# Patient Record
Sex: FEMALE | Race: WHITE | NOT HISPANIC OR LATINO | URBAN - METROPOLITAN AREA
[De-identification: names, ages, dates, MRNs, and addresses within clinical notes are randomized per-mention and may not be internally consistent; named-entity substitution may affect disease eponyms.]

---
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
## 2022-01-05 ENCOUNTER — MYC MEDICAL ADVICE (OUTPATIENT)
Dept: FAMILY MEDICINE | Facility: CLINIC | Age: 22
End: 2022-01-05
Payer: COMMERCIAL

## 2022-01-05 DIAGNOSIS — R51.9 CHRONIC DAILY HEADACHE: Primary | ICD-10-CM

## 2022-01-13 ENCOUNTER — TRANSFERRED RECORDS (OUTPATIENT)
Dept: HEALTH INFORMATION MANAGEMENT | Facility: CLINIC | Age: 22
End: 2022-01-13
Payer: COMMERCIAL

## 2022-01-16 ENCOUNTER — HEALTH MAINTENANCE LETTER (OUTPATIENT)
Age: 22
End: 2022-01-16

## 2022-02-18 ENCOUNTER — OFFICE VISIT (OUTPATIENT)
Dept: URGENT CARE | Facility: URGENT CARE | Age: 22
End: 2022-02-18
Payer: COMMERCIAL

## 2022-02-18 VITALS
TEMPERATURE: 97.9 F | OXYGEN SATURATION: 99 % | SYSTOLIC BLOOD PRESSURE: 134 MMHG | HEART RATE: 83 BPM | WEIGHT: 141.8 LBS | BODY MASS INDEX: 20.35 KG/M2 | DIASTOLIC BLOOD PRESSURE: 81 MMHG

## 2022-02-18 DIAGNOSIS — R19.09 GROIN LUMP: ICD-10-CM

## 2022-02-18 DIAGNOSIS — R10.31 RLQ ABDOMINAL PAIN: Primary | ICD-10-CM

## 2022-02-18 DIAGNOSIS — R20.2 RIGHT LEG PARESTHESIAS: ICD-10-CM

## 2022-02-18 PROCEDURE — 99214 OFFICE O/P EST MOD 30 MIN: CPT | Performed by: PHYSICIAN ASSISTANT

## 2022-02-18 NOTE — PROGRESS NOTES
Chief Complaint   Patient presents with     Derm Problem     Bump on right groin area, right on the pelvic bone and it's making right leg numb. Onset- Three days                   ASSESSMENT:     ICD-10-CM    1. RLQ abdominal pain  R10.31    2. Groin lump  R19.09    3. Right leg paresthesias  R20.2            PLAN:RLQ stabbing pain 3 days ago plus r leg diffuse numbness which can be reproduced by pushing on a 1 mm groin lump and causes her toes to tingle. Limited on labs and imaging. ? Vascular, nerve, reproductive etiology    I have discussed clinical findings with patient.    All questions are answered, patient indicates understanding of these issues and is in agreement with plan.   Patient care instructions are discussed/given at the end of visit.     Amanda Castellanos PA-C      SUBJECTIVE:  21-year-old female presents for right lower quadrant pain that she described initially as a cramping sensation but now is a stabbing sensation.  Now today with a tender bump in the groin that causes tingling into her toes when she palpates it.  No constipation or diarrhea.  No nausea or vomiting.  No fever.  Appetite has been normal.  History of some low back pain but not worse with this.  No radicular pain from the back.  No bowel or bladder trouble.  No dysuria, frequency, urgency.      Allergies   Allergen Reactions     Egg [Chicken-Derived Products (Egg)]      Yolk cause stomach ache, diarrhea, nausea.  Egg whites okay to eat       Past Medical History:   Diagnosis Date     Dyslexia 6/12/2014       norethin-eth estradiol-fe (GILDESS 24 FE) 1-20 MG-MCG(24) tablet, Take 1 tablet by mouth daily    No current facility-administered medications on file prior to visit.      Social History     Tobacco Use     Smoking status: Never Smoker     Smokeless tobacco: Never Used   Substance Use Topics     Alcohol use: No       ROS:  CONSTITUTIONAL: Negative for fatigue or fever.  EYES: Negative for eye problems.  ENT: Negative for  sore throat .  RESP: Negative for shortness of breath   CV: Negative for chest pains.  GI: Negative for vomiting.  MUSCULOSKELETAL:  Negative for significant muscle or joint pains.  NEUROLOGIC: Negative for headaches.  SKIN: Negative for rash.    PSYCH: Normal mentation for age.    OBJECTIVE:  /81 (BP Location: Left arm, Patient Position: Sitting, Cuff Size: Adult Regular)   Pulse 83   Temp 97.9  F (36.6  C) (Tympanic)   Wt 64.3 kg (141 lb 12.8 oz)   SpO2 99%   BMI 20.35 kg/m      GENERAL APPEARANCE: Healthy, alert and no distress.  EYES:Conjunctiva/sclera clear.  NECK: Supple, nontender, no lymphadenopathy.  RESP: Lungs clear to auscultation - no rales, rhonchi or wheezes  CV: Regular rate and rhythm, normal S1 S2, no murmur noted.  NEURO: Awake, alert    SKIN: No rashes  Back-no CVA tenderness.  Lumbar spine is nontender to palpation.  Trochanteric area is nontender with full range of motion of the hips.  Right lower quadrant tenderness to palpation.  No rigidity, guarding or rebound.  Right groin area is with a 1 cm firm lump.  When I palpate on it it creates tingling into the toes.  Full range of motion of the right leg at the hip, knee, ankle.  Sensation to soft touch intact lower extremity.      Amanda Castellanos PA-C

## 2022-02-18 NOTE — PATIENT INSTRUCTIONS
RLQ stabbing pain 3 days ago plus r leg diffuse numbness which can be reproduced by pushing on a 1 mm groin lump and causes her toes to tingle. Limited on labs and imaging. ? Vascular, nerve, reproductive etiology

## 2022-02-23 ENCOUNTER — DOCUMENTATION ONLY (OUTPATIENT)
Dept: LAB | Facility: CLINIC | Age: 22
End: 2022-02-23
Payer: COMMERCIAL

## 2022-02-23 DIAGNOSIS — M32.0 DRUG-INDUCED SYSTEMIC LUPUS ERYTHEMATOSUS, UNSPECIFIED ORGAN INVOLVEMENT STATUS (H): Primary | ICD-10-CM

## 2022-02-23 DIAGNOSIS — M32.19 OTHER SYSTEMIC LUPUS ERYTHEMATOSUS WITH OTHER ORGAN INVOLVEMENT (H): ICD-10-CM

## 2022-02-23 NOTE — PROGRESS NOTES
Hello-   Patient is asking for a standing lab order for Lupus panel. See lab appointment notes for reference.     Please order future labs for 2-23 appointment. Thanks, Susie COXT

## 2022-02-24 ENCOUNTER — LAB (OUTPATIENT)
Dept: LAB | Facility: CLINIC | Age: 22
End: 2022-02-24
Payer: COMMERCIAL

## 2022-02-24 DIAGNOSIS — M32.19 OTHER SYSTEMIC LUPUS ERYTHEMATOSUS WITH OTHER ORGAN INVOLVEMENT (H): ICD-10-CM

## 2022-02-24 PROCEDURE — 86038 ANTINUCLEAR ANTIBODIES: CPT

## 2022-02-24 PROCEDURE — 36415 COLL VENOUS BLD VENIPUNCTURE: CPT

## 2022-02-24 PROCEDURE — 86235 NUCLEAR ANTIGEN ANTIBODY: CPT

## 2022-02-24 PROCEDURE — 86225 DNA ANTIBODY NATIVE: CPT

## 2022-02-25 LAB
ANA SER QL IF: NEGATIVE
DSDNA AB SER-ACNC: 1.2 IU/ML
ENA SM IGG SER IA-ACNC: <1.6 U/ML
ENA SM IGG SER IA-ACNC: NEGATIVE

## 2022-03-26 ASSESSMENT — PATIENT HEALTH QUESTIONNAIRE - PHQ9
SUM OF ALL RESPONSES TO PHQ QUESTIONS 1-9: 23
10. IF YOU CHECKED OFF ANY PROBLEMS, HOW DIFFICULT HAVE THESE PROBLEMS MADE IT FOR YOU TO DO YOUR WORK, TAKE CARE OF THINGS AT HOME, OR GET ALONG WITH OTHER PEOPLE: VERY DIFFICULT
SUM OF ALL RESPONSES TO PHQ QUESTIONS 1-9: 23

## 2022-03-27 ASSESSMENT — PATIENT HEALTH QUESTIONNAIRE - PHQ9: SUM OF ALL RESPONSES TO PHQ QUESTIONS 1-9: 23

## 2022-03-29 ENCOUNTER — OFFICE VISIT (OUTPATIENT)
Dept: OBGYN | Facility: CLINIC | Age: 22
End: 2022-03-29
Payer: COMMERCIAL

## 2022-03-29 VITALS
HEART RATE: 76 BPM | WEIGHT: 142.8 LBS | OXYGEN SATURATION: 97 % | BODY MASS INDEX: 20.44 KG/M2 | HEIGHT: 70 IN | SYSTOLIC BLOOD PRESSURE: 126 MMHG | DIASTOLIC BLOOD PRESSURE: 85 MMHG

## 2022-03-29 DIAGNOSIS — Z11.4 SCREENING FOR HIV (HUMAN IMMUNODEFICIENCY VIRUS): ICD-10-CM

## 2022-03-29 DIAGNOSIS — F32.0 CURRENT MILD EPISODE OF MAJOR DEPRESSIVE DISORDER WITHOUT PRIOR EPISODE (H): ICD-10-CM

## 2022-03-29 DIAGNOSIS — Z30.41 ENCOUNTER FOR SURVEILLANCE OF CONTRACEPTIVE PILLS: ICD-10-CM

## 2022-03-29 DIAGNOSIS — Z01.419 ENCOUNTER FOR GYNECOLOGICAL EXAMINATION WITHOUT ABNORMAL FINDING: Primary | ICD-10-CM

## 2022-03-29 DIAGNOSIS — Z11.3 SCREENING EXAMINATION FOR VENEREAL DISEASE: ICD-10-CM

## 2022-03-29 DIAGNOSIS — A60.00 GENITAL HERPES SIMPLEX, UNSPECIFIED SITE: ICD-10-CM

## 2022-03-29 DIAGNOSIS — Z11.59 NEED FOR HEPATITIS C SCREENING TEST: ICD-10-CM

## 2022-03-29 LAB — HCV AB SERPL QL IA: NONREACTIVE

## 2022-03-29 PROCEDURE — 87491 CHLMYD TRACH DNA AMP PROBE: CPT | Performed by: NURSE PRACTITIONER

## 2022-03-29 PROCEDURE — 86803 HEPATITIS C AB TEST: CPT | Performed by: NURSE PRACTITIONER

## 2022-03-29 PROCEDURE — 87591 N.GONORRHOEAE DNA AMP PROB: CPT | Performed by: NURSE PRACTITIONER

## 2022-03-29 PROCEDURE — 36415 COLL VENOUS BLD VENIPUNCTURE: CPT | Performed by: NURSE PRACTITIONER

## 2022-03-29 PROCEDURE — 87389 HIV-1 AG W/HIV-1&-2 AB AG IA: CPT | Performed by: NURSE PRACTITIONER

## 2022-03-29 PROCEDURE — 99385 PREV VISIT NEW AGE 18-39: CPT | Performed by: NURSE PRACTITIONER

## 2022-03-29 RX ORDER — VALACYCLOVIR HYDROCHLORIDE 500 MG/1
500 TABLET, FILM COATED ORAL 2 TIMES DAILY
Qty: 6 TABLET | Refills: 2 | Status: SHIPPED | OUTPATIENT
Start: 2022-03-29 | End: 2023-03-21

## 2022-03-29 ASSESSMENT — PAIN SCALES - GENERAL: PAINLEVEL: NO PAIN (0)

## 2022-03-29 NOTE — PROGRESS NOTES
SUBJECTIVE:   CC: Kallie Olsen is an 21 year old woman who presents for preventive health visit.     {Healthy Habits:     Getting at least 3 servings of Calcium per day:  Yes    Bi-annual eye exam:  Yes    Dental care twice a year:  Yes    Sleep apnea or symptoms of sleep apnea:  Daytime drowsiness    Diet:  Gluten-free/reduced and Other    Frequency of exercise:  4-5 days/week    Duration of exercise:  Greater than 60 minutes    Taking medications regularly:  Yes    Medication side effects:  None    PHQ-2 Total Score: 6    Additional concerns today:  Yes    History of genital HSV and recently used the last of her Valtrex, requests refills to have on hand.   History of major depression, has tried several different medications and did not do well on them. Not currently taking anything. PHQ-9 score is high today. Denies any plans to harm herself or others.       Today's PHQ-2 Score:   PHQ-2 ( 1999 Pf[employer]) 3/26/2022   Q1: Little interest or pleasure in doing things 3   Q2: Feeling down, depressed or hopeless 3   PHQ-2 Score 6   PHQ-2 Total Score (12-17 Years)- Positive if 3 or more points; Administer PHQ-A if positive -   Q1: Little interest or pleasure in doing things Nearly every day   Q2: Feeling down, depressed or hopeless Nearly every day   PHQ-2 Score 6       Abuse: Current or Past (Physical, Sexual or Emotional) - No  Do you feel safe in your environment? Yes        Social History     Tobacco Use     Smoking status: Never Smoker     Smokeless tobacco: Never Used   Substance Use Topics     Alcohol use: No         Alcohol Use 3/26/2022   Prescreen: >3 drinks/day or >7 drinks/week? Not Applicable   No flowsheet data found.    Reviewed orders with patient.  Reviewed health maintenance and updated orders accordingly - Yes  Patient Active Problem List   Diagnosis     Behavior problems     Dyslexia     Helicobacter pylori (H. pylori) infection     Current mild episode of major depressive disorder without prior  episode (H)     History of multiple concussions     Chronic daily headache     Positive TURNER (antinuclear antibody)     Past Surgical History:   Procedure Laterality Date     no surgical history         Social History     Tobacco Use     Smoking status: Never Smoker     Smokeless tobacco: Never Used   Substance Use Topics     Alcohol use: No     Family History   Problem Relation Age of Onset     Cancer - colorectal Maternal Grandmother      Cancer Maternal Grandmother      Diabetes Maternal Grandmother      Myocardial Infarction Maternal Grandfather      Alzheimer Disease Other         MGGF     Cancer Other         PGGF bladder     Breast Cancer Other         PGGM, great aunts paternal and maternal     Glaucoma No family hx of            Breast Cancer Screening:        History of abnormal Pap smear: NO - under age 21, PAP not appropriate for age     Reviewed and updated as needed this visit by clinical staff   Tobacco  Allergies  Meds   Med Hx  Surg Hx  Fam Hx  Soc Hx        Reviewed and updated as needed this visit by Provider                 Past Medical History:   Diagnosis Date     Dyslexia 6/12/2014      Past Surgical History:   Procedure Laterality Date     no surgical history         Review of Systems  CONSTITUTIONAL: NEGATIVE for fever, chills, change in weight  INTEGUMENTARU/SKIN: NEGATIVE for worrisome rashes, moles or lesions  EYES: NEGATIVE for vision changes or irritation  ENT: NEGATIVE for ear, mouth and throat problems  RESP: NEGATIVE for significant cough or SOB  BREAST: NEGATIVE for masses, tenderness or discharge  CV: NEGATIVE for chest pain, palpitations or peripheral edema  GI: NEGATIVE for nausea, abdominal pain, heartburn, or change in bowel habits  : NEGATIVE for unusual urinary or vaginal symptoms. Periods are regular.  MUSCULOSKELETAL: NEGATIVE for significant arthralgias or myalgia  NEURO: NEGATIVE for weakness, dizziness or paresthesias  PSYCHIATRIC: See PHQ-9     OBJECTIVE:   BP  "126/85 (BP Location: Right arm, Patient Position: Sitting, Cuff Size: Adult Regular)   Pulse 76   Ht 1.772 m (5' 9.75\")   Wt 64.8 kg (142 lb 12.8 oz)   LMP 03/08/2022 (Approximate)   SpO2 97%   BMI 20.64 kg/m    Physical Exam  GENERAL: healthy, alert and no distress  EYES: Eyes grossly normal to inspection, PERRL and conjunctivae and sclerae normal  HENT: ear canals and TM's normal  NECK: no adenopathy, no asymmetry, masses, or scars and thyroid normal to palpation  RESP: lungs clear to auscultation - no rales, rhonchi or wheezes  BREAST: normal without masses, tenderness or nipple discharge and no palpable axillary masses or adenopathy  CV: regular rate and rhythm, normal S1 S2, no S3 or S4, no murmur, click or rub, no peripheral edema and peripheral pulses strong  ABDOMEN: soft, nontender, no hepatosplenomegaly, no masses and bowel sounds normal   (female): normal female external genitalia, normal urethral meatus, vaginal mucosa pink, moist, well rugated, and normal cervix/adnexa/uterus without masses or discharge  MS: no gross musculoskeletal defects noted, no edema  SKIN: no suspicious lesions or rashes  NEURO: Normal strength and tone, mentation intact and speech normal  PSYCH: mentation appears normal, affect normal/bright    ASSESSMENT/PLAN:   (Z01.419) Encounter for gynecological examination without abnormal finding  (primary encounter diagnosis)  Comment: Patient was requesting pap smear and discussed that we begin screening at age 23.     (Z11.3) Screening examination for venereal disease  Plan: Chlamydia trachomatis PCR, Neisseria         gonorrhoeae PCR        (Z11.59) Need for hepatitis C screening test  Plan: Hepatitis C antibody         (Z11.4) Screening for HIV (human immunodeficiency virus)  Plan: HIV Antigen Antibody Combo          (F32.0) Current mild episode of major depressive disorder without prior episode (H)  Comment: We reviewed her PHQ-9 in detail. Patient denies any specific plans " "or intent to harm herself. Has tried medications in the past and did not do well. We discussed option of referral to a mental health provider and she declines. Discussed providing her with [facility] crisis information and she declines. States her parents provide support. Declines any other intervention at this time. To ER immediately with thoughts of harm to self or other and she verbalizes understanding. To return to clinic if she would like to discuss intervention options.    (A60.00) Genital herpes simplex, unspecified site  Plan: valACYclovir (VALTREX) 500 MG tablet          (Z30.41) Encounter for surveillance of contraceptive pills  Comment: Doing well on current oral contraceptive pill, will refill x 1 year  Plan: norethin-eth estradiol-fe (GILDESS 24 FE) 1-20         MG-MCG(24) tablet       COUNSELING:  Reviewed preventive health counseling, as reflected in patient instructions  Special attention given to:        Regular exercise       Healthy diet/nutrition       Contraception       Safe sex practices/STD prevention       Consider Hep C screening for all patients one time for ages 18-79 years       HIV screeninx in teen years, 1x in adult years, and at intervals if high risk    Estimated body mass index is 20.64 kg/m  as calculated from the following:    Height as of this encounter: 1.772 m (5' 9.75\").    Weight as of this encounter: 64.8 kg (142 lb 12.8 oz).        She reports that she has never smoked. She has never used smokeless tobacco.      Counseling Resources:  ATP IV Guidelines  Pooled Cohorts Equation Calculator  Breast Cancer Risk Calculator  BRCA-Related Cancer Risk Assessment: FHS-7 Tool  FRAX Risk Assessment  ICSI Preventive Guidelines  Dietary Guidelines for Americans,   USDA's MyPlate  ASA Prophylaxis  Lung CA Screening    KENDAL Medrano [facility]  Answers for HPI/ROS submitted by the patient on 3/26/2022  If you checked off any problems, how " difficult have these problems made it for you to do your work, take care of things at home, or get along with other people?: Very difficult  PHQ9 TOTAL SCORE: 23

## 2022-03-30 ENCOUNTER — MYC MEDICAL ADVICE (OUTPATIENT)
Dept: OBGYN | Facility: CLINIC | Age: 22
End: 2022-03-30
Payer: COMMERCIAL

## 2022-03-30 DIAGNOSIS — Z30.41 ENCOUNTER FOR SURVEILLANCE OF CONTRACEPTIVE PILLS: Primary | ICD-10-CM

## 2022-03-30 LAB
C TRACH DNA SPEC QL NAA+PROBE: NEGATIVE
HIV 1+2 AB+HIV1 P24 AG SERPL QL IA: NONREACTIVE
N GONORRHOEA DNA SPEC QL NAA+PROBE: NEGATIVE

## 2022-03-31 RX ORDER — NORETHINDRONE ACETATE AND ETHINYL ESTRADIOL 1.5-30(21)
1 KIT ORAL DAILY
Qty: 84 TABLET | Refills: 3 | Status: SHIPPED | OUTPATIENT
Start: 2022-03-31 | End: 2022-06-03 | Stop reason: ALTCHOICE

## 2022-03-31 NOTE — TELEPHONE ENCOUNTER
"Pt was seen on 3/29/22 with KENDAL Harris CNP, for a yearly physical.  Per OV plan:  \"Z30.41) Encounter for surveillance of contraceptive pills  Comment: Doing well on current oral contraceptive pill, will refill x 1 year  Plan: norethin-eth estradiol-fe (GILDESS 24 FE) 1-20         MG-MCG(24) tablet\"    Pt states that the pharmacist told her that she was prescribed a lower dose of BCP than what she was on.  She states when she was on the lower dose in the past she would \"spit\" (I think she meant spot) throughout the month.  Pt is asking for an rx of Junel Fe 1.5/30 be sent to pharmacy.    Routing to Minnie to approve a different BCP as appropriate.    Carmen Duran RN      "

## 2022-05-06 ASSESSMENT — PATIENT HEALTH QUESTIONNAIRE - PHQ9
10. IF YOU CHECKED OFF ANY PROBLEMS, HOW DIFFICULT HAVE THESE PROBLEMS MADE IT FOR YOU TO DO YOUR WORK, TAKE CARE OF THINGS AT HOME, OR GET ALONG WITH OTHER PEOPLE: SOMEWHAT DIFFICULT
SUM OF ALL RESPONSES TO PHQ QUESTIONS 1-9: 22
SUM OF ALL RESPONSES TO PHQ QUESTIONS 1-9: 22

## 2022-05-07 ASSESSMENT — PATIENT HEALTH QUESTIONNAIRE - PHQ9: SUM OF ALL RESPONSES TO PHQ QUESTIONS 1-9: 22

## 2022-05-09 ENCOUNTER — OFFICE VISIT (OUTPATIENT)
Dept: FAMILY MEDICINE | Facility: CLINIC | Age: 22
End: 2022-05-09
Payer: COMMERCIAL

## 2022-05-09 VITALS
SYSTOLIC BLOOD PRESSURE: 106 MMHG | OXYGEN SATURATION: 97 % | HEART RATE: 91 BPM | RESPIRATION RATE: 14 BRPM | TEMPERATURE: 98.5 F | BODY MASS INDEX: 20.04 KG/M2 | DIASTOLIC BLOOD PRESSURE: 72 MMHG | WEIGHT: 140 LBS | HEIGHT: 70 IN

## 2022-05-09 DIAGNOSIS — F33.2 SEVERE EPISODE OF RECURRENT MAJOR DEPRESSIVE DISORDER, WITHOUT PSYCHOTIC FEATURES (H): ICD-10-CM

## 2022-05-09 DIAGNOSIS — Z87.820 HISTORY OF MULTIPLE CONCUSSIONS: ICD-10-CM

## 2022-05-09 DIAGNOSIS — R41.844 IMPAIRED EXECUTIVE FUNCTIONING: ICD-10-CM

## 2022-05-09 DIAGNOSIS — R41.3 POOR SHORT-TERM MEMORY: Primary | ICD-10-CM

## 2022-05-09 PROBLEM — F33.9 EPISODE OF RECURRENT MAJOR DEPRESSIVE DISORDER (H): Status: ACTIVE | Noted: 2020-02-17

## 2022-05-09 PROCEDURE — 99213 OFFICE O/P EST LOW 20 MIN: CPT | Performed by: FAMILY MEDICINE

## 2022-05-09 RX ORDER — SERTRALINE HYDROCHLORIDE 25 MG/1
25 TABLET, FILM COATED ORAL DAILY
Qty: 30 TABLET | Refills: 2 | Status: SHIPPED | OUTPATIENT
Start: 2022-05-09 | End: 2022-07-22 | Stop reason: DRUGHIGH

## 2022-05-09 ASSESSMENT — PATIENT HEALTH QUESTIONNAIRE - PHQ9
SUM OF ALL RESPONSES TO PHQ QUESTIONS 1-9: 22
10. IF YOU CHECKED OFF ANY PROBLEMS, HOW DIFFICULT HAVE THESE PROBLEMS MADE IT FOR YOU TO DO YOUR WORK, TAKE CARE OF THINGS AT HOME, OR GET ALONG WITH OTHER PEOPLE: SOMEWHAT DIFFICULT

## 2022-05-09 ASSESSMENT — PAIN SCALES - GENERAL: PAINLEVEL: MODERATE PAIN (4)

## 2022-05-09 NOTE — PATIENT INSTRUCTIONS
At [facility], we strive to deliver an exceptional experience to you, every time we see you. If you receive a survey, please complete it as we do value your feedback.  If you have MyChart, you can expect to receive results automatically within 24 hours of their completion.  Your provider will send a note interpreting your results as well.   If you do not have MyChart, you should receive your results in about a week by mail.    Your care team:                            Family Medicine Internal Medicine   MD Sam Almanzar MD Shantel Branch-Fleming, MD Srinivasa Vaka, MD Katya Belousova, KENDAL Christian CNP, MD ([facility]   Hermes De La Rosa, MD Fabiana George MD Amelia Massimini APRN CNP Kim Thein, APRN CNP Bethany Templen, MD             Clinic hours: Monday - Thursday 7 am-6 pm; Fridays 7 am-5 pm.   Urgent care: Monday - Friday 10 am- 8 pm; Saturday and Sunday 9 am-5 pm.    Clinic: [phone]       [facility]: Monday - Thursday 8 am - 7 pm; Friday 8 am - 6 pm  [facility]: [phone]

## 2022-05-09 NOTE — PROGRESS NOTES
Assessment & Plan     (R41.3) Poor short-term memory  (primary encounter diagnosis)  (R41.844) Impaired executive functioning  (Z87.820) History of multiple concussions  Comment: It seems the patient and her mother are seeking neuropsychometric testing  Plan: Adult Neuropsychology Referral, Adult Mental         [facility]  Referral          (F33.2) Severe episode of recurrent major depressive disorder, without psychotic features (H)  Comment: She failed bupropion, and she experienced side effects with the venlafaxine.  I advised the patient and her mother that we cannot completely discount severe depression as the cause of this.  Issue with executive functioning (i.e. anhedonia and poor concentration, symptoms of depression, coincidentally occurring around the time of her most recent head injury).  The converse could also be true.  Regardless, it seems important that we find a medication to treat your depression that does not cause side effects  Plan: sertraline (ZOLOFT) 25 MG tablet, Adult Mental         Health  Referral, [facility]          Referral         Return in about 5 weeks (around 6/13/2022) for recheck depression, or sooner if symptoms worsen.          27 minutes spent on the date of the encounter doing chart review, patient visit and documentation       Earl Solo MD  [facility] LAURA Arreguin is a 21 year old who presents for the following health issues  accompanied by her mother.    History of Present Illness       Mental Health Follow-up:                    Today's PHQ-9         PHQ-9 Total Score: 22  PHQ-9 Q9 Thoughts of better off dead/self-harm past 2 weeks :   (P) More than half the days  Thoughts of suicide or self harm: (P) Yes  Self-harm Plan:   (P) No  Self-harm Action:     (P) No  Safety concerns for self or others: (P) No    How difficult have these problems made it for you to do your work, take care of things at  "home, or get along with other people: Somewhat difficult        Reason for visit:  Discuss memory and executive function skills  Symptom onset:  More than a month  Symptom intensity:  Moderate  Symptom progression:  Worsening  Had these symptoms before:  Yes  Has tried/received treatment for these symptoms:  No  What makes it worse:  N/a  What makes it better:  N/a    She eats 2-3 servings of fruits and vegetables daily.She consumes 0 sweetened beverage(s) daily.She exercises with enough effort to increase her heart rate 60 or more minutes per day.  She exercises with enough effort to increase her heart rate 6 days per week.   She is taking medications regularly.         Review of Systems         Objective    /72 (BP Location: Right arm, Patient Position: Sitting, Cuff Size: Adult Regular)   Pulse 91   Temp 98.5  F (36.9  C) (Tympanic)   Resp 14   Ht 1.773 m (5' 9.8\")   Wt 63.5 kg (140 lb)   SpO2 97%   BMI 20.20 kg/m    Body mass index is 20.2 kg/m .  Physical Exam   GENERAL: healthy, alert and no distress  EYES: Eyes grossly normal to inspection, PERRL, EOMI, sclerae white and conjunctivae normal  MS: no gross musculoskeletal defects noted, no edema  SKIN: no suspicious lesions or rashes to visible skin  NEURO: Normal strength and tone, sensory exam grossly normal, mentation intact, oriented times 3 and cranial nerves 2-12 intact  PSYCH: mentation appears slightly slowed but otherwise normal normal, affect normal, nonsuicidal                "

## 2022-06-01 NOTE — PROGRESS NOTES
Kallie is a 21 year old who is being evaluated via a billable telephone visit.      What phone number would you like to be contacted at? [phone]  How would you like to obtain your AVS? Ortega    Assessment & Plan     Breakthrough bleeding on birth control pills  We discussed her breakthrough bleeding and management options. Would like to try alternate pill. Discussed rationale for pill below, when to change. May have some breakthrough bleeding the first few packs as she adjusts to new pills. If it persists beyond 3 packs, would recommend follow up appointment and consider ultrasound and labs.  Patient is given an opportunity to ask questions and have them answered.  - desogestrel-ethinyl estradiol (APRI) 0.15-30 MG-MCG tablet; Take 1 tablet by mouth daily    KENDAL Medrano [facility]   Kallie is a 21 year old who requested a telephone visit for the following health issues    HPI     Breakthrough bleeding with OCP    Patient on combined oral contraceptive pill and taking it regularly. For the last several packs, has been having breakthrough bleeding. Can occur at varied times during the pack, flow varies from spotting to light. Lasts a few days, can stop and restart. During placebo week, cycle starting towards end of the week now. Occasional cramping, no clots. No heavy flow. Denies missed or late pills, no major lifestyle changes. Denies abnormal vaginal or urinary symptoms, pelvic pain, no STI concerns.    Review of Systems   Constitutional, HEENT, cardiovascular, pulmonary, gi and gu systems are negative, except as otherwise noted.      Objective         Vitals:  No vitals were obtained today due to virtual visit.    Physical Exam   healthy and alert  PSYCH: Alert and oriented times 3; coherent speech, normal   rate and volume, able to articulate logical thoughts, able   to abstract reason, no tangential thoughts, no hallucinations   or delusions  Her  affect is normal  RESP: No cough, no audible wheezing, able to talk in full sentences  Remainder of exam unable to be completed due to telephone visits      Phone call duration: 7 minutes

## 2022-06-03 ENCOUNTER — VIRTUAL VISIT (OUTPATIENT)
Dept: OBGYN | Facility: CLINIC | Age: 22
End: 2022-06-03
Payer: COMMERCIAL

## 2022-06-03 DIAGNOSIS — F33.2 SEVERE EPISODE OF RECURRENT MAJOR DEPRESSIVE DISORDER, WITHOUT PSYCHOTIC FEATURES (H): ICD-10-CM

## 2022-06-03 DIAGNOSIS — N92.1 BREAKTHROUGH BLEEDING ON BIRTH CONTROL PILLS: Primary | ICD-10-CM

## 2022-06-03 PROCEDURE — 99213 OFFICE O/P EST LOW 20 MIN: CPT | Mod: 95 | Performed by: NURSE PRACTITIONER

## 2022-06-03 RX ORDER — DESOGESTREL AND ETHINYL ESTRADIOL 0.15-0.03
1 KIT ORAL DAILY
Qty: 84 TABLET | Refills: 3 | Status: SHIPPED | OUTPATIENT
Start: 2022-06-03 | End: 2023-03-21

## 2022-06-12 NOTE — TELEPHONE ENCOUNTER
FUTURE VISIT INFORMATION      FUTURE VISIT INFORMATION:    Date: 6/27/2022    Time: 11am    Location: [facility]  REFERRAL INFORMATION:    Referring provider:  Self     Referring providers clinic:      Reason for visit/diagnosis  Concussion     RECORDS REQUESTED FROM:       Clinic name Comments Records Status Imaging Status   Internal Dr. Solo-5/9/2022 Epic No Images          [facility]  MR Brain-3/3/2020  MR cervical spine - 01/29/2020   Scanned to Chart Requested to PACS                       Images in PACS  Riya Rios on 6/21/2022 at 12:56 PM    6/13/2022-[facility] 748am

## 2022-06-27 ENCOUNTER — VIRTUAL VISIT (OUTPATIENT)
Dept: NEUROLOGY | Facility: CLINIC | Age: 22
End: 2022-06-27
Payer: COMMERCIAL

## 2022-06-27 ENCOUNTER — PRE VISIT (OUTPATIENT)
Dept: NEUROLOGY | Facility: CLINIC | Age: 22
End: 2022-06-27
Payer: COMMERCIAL

## 2022-06-27 DIAGNOSIS — F41.9 ANXIETY: ICD-10-CM

## 2022-06-27 DIAGNOSIS — Z87.820 HISTORY OF MULTIPLE CONCUSSIONS: ICD-10-CM

## 2022-06-27 DIAGNOSIS — R51.9 CHRONIC DAILY HEADACHE: Primary | ICD-10-CM

## 2022-06-27 DIAGNOSIS — R48.0 DYSLEXIA: ICD-10-CM

## 2022-06-27 DIAGNOSIS — R41.3 SHORT-TERM MEMORY LOSS: ICD-10-CM

## 2022-06-27 PROCEDURE — 99205 OFFICE O/P NEW HI 60 MIN: CPT | Mod: 95 | Performed by: STUDENT IN AN ORGANIZED HEALTH CARE EDUCATION/TRAINING PROGRAM

## 2022-06-27 RX ORDER — RIZATRIPTAN BENZOATE 10 MG/1
10 TABLET ORAL
Qty: 9 TABLET | Refills: 4 | Status: SHIPPED | OUTPATIENT
Start: 2022-06-27 | End: 2023-09-28

## 2022-06-27 RX ORDER — GABAPENTIN 300 MG/1
300 CAPSULE ORAL 3 TIMES DAILY
Qty: 90 CAPSULE | Refills: 5 | Status: SHIPPED | OUTPATIENT
Start: 2022-06-27 | End: 2022-10-03

## 2022-06-27 NOTE — PATIENT INSTRUCTIONS
Magnesium oxide 400 mg Riboflavin (vitamin b2) 400 mg daily  Start gabapentin 300 mg nightly. After about one week, take 300mg twice daily. After another week, take 300mg three times daily for a total of 900mg per day.--start this in a few weeks time to let zoloft kick in  A common side effect of gabapentin is fatigue, which is why we advise patients to start taking the medication at nighttime. The side effect of fatigue should resolve with time.  Other possible side effects include swelling, and less likely diarrhea.  It is commonly a well tolerated medicine.      Increase zoloft to 50 mg daily, F/U with your PCP in this regard too.     Other ideas discussed:  Ocular PT  Psychology/a therapist.

## 2022-06-27 NOTE — PROGRESS NOTES
[facility]/[place]  Section of General Neurology  New Patient  Virtual Visit      Kallie Olsen MRN# [id number]   Age: 22 year old YOB: 2000     Requesting physician: No ref. provider found  Earl Solo     Reason for Consultation: Headaches, poor short term memory.         History of Presenting Symptoms:   Kallie Olsen is a 22 year old female who presents today for evaluation of poor short term memory, executive dysfunction  She has a history of headaches, depression, multiple concussions.  She has been put on zoloft for depression didn't tolerate or derive benefit from venlafaxine/buproprion previously, of note.  She has a neuropsychology referral in place in this regard scheduled for October.    Concussion hx:   Last concussion spring 2019--history books fell off a shelf at hit her head.  3 in total  None with LOC     Headache--Since last concussion, but had before then as well to a lesser extent.    Stabbing pains, start at temple, go all the way, occasional phono/photophobia,  Is always nauseous.  Does get visual aura at times.    If she gets a really bad headache she is curled up in a ball, can't move.  Has had to go to ER and migraine cocktail has helped.    Has had issues with coordinating eye movements --visual processing doesn't get to her brain.     She is at a 4 most days re pain levels  She potentially has tried imitrex and it didn't  Mood --Remains not the best.  Zoloft hasn't helped but she is tolerating it OK.  Had trouble connecting with Dr. Solo this morning.    Sleep -- Is fine.  Can stay asleep.  Is tired all day however.  Is getting 7-8 hours a night  Is taking classes right now--at [employer], working on exercise science degree.    She notes a poor attention span overall.  She falls asleep in class.  She does have dyslexia also.  Has previously worked with a  in this regard too.   Gene testing medicines she would tolerate poorly:  Remeron,  luvox, cymbalta, per mom    Memory: Has issues with short term memory she attributes to concussive history.  Though she had to work extra hard with her dyslexia too.      They live in [place]      Past Medical History:     Patient Active Problem List   Diagnosis     Behavior problems     Dyslexia     Helicobacter pylori (H. pylori) infection     Episode of recurrent major depressive disorder (H)     History of multiple concussions     Chronic daily headache     Positive TURNER (antinuclear antibody)     Past Medical History:   Diagnosis Date     Current mild episode of major depressive disorder without prior episode (H) 2/17/2020     Dyslexia 6/12/2014        Past Surgical History:     Past Surgical History:   Procedure Laterality Date     no surgical history          Social History:     Social History     Tobacco Use     Smoking status: Never Smoker     Smokeless tobacco: Never Used   Substance Use Topics     Alcohol use: No     Drug use: No        Family History:     Family History   Problem Relation Age of Onset     Cancer - colorectal Maternal Grandmother      Cancer Maternal Grandmother      Diabetes Maternal Grandmother      Myocardial Infarction Maternal Grandfather      Alzheimer Disease Other         MGGF     Cancer Other         PGGF bladder     Breast Cancer Other         PGGM, great aunts paternal and maternal     Glaucoma No family hx of         Medications:     Current Outpatient Medications   Medication Sig     desogestrel-ethinyl estradiol (APRI) 0.15-30 MG-MCG tablet Take 1 tablet by mouth daily     sertraline (ZOLOFT) 25 MG tablet Take 1 tablet (25 mg) by mouth daily     valACYclovir (VALTREX) 500 MG tablet Take 1 tablet (500 mg) by mouth 2 times daily for 3 days     No current facility-administered medications for this visit.        Allergies:     Allergies   Allergen Reactions     Egg [Chicken-Derived Products (Egg)]      Yolk cause stomach ache, diarrhea, nausea.  Egg whites okay to eat         Review of Systems:   As noted above     Physical Exam:   General: Seated comfortably in no acute distress.  HEENT: Neck with normal range of motion as can be assessed.   Skin: No rashes  Neurologic:     Mental Status: Fully alert, attentive and oriented. Speech clear and fluent.  Can do #s forwards and backwards. Serial 7s: Got 93, then struggled, Repetition intact.  Abstraction intact.  5/5 delayed recall though took some time.       Cranial Nerves: EOMI with normal smooth pursuit--looking up cause some diplopia, this is a chronic problem. Facial movements symmetric. Hearing not formally tested but intact to conversation.  No dysarthria.     Motor: No tremors or other abnormal movements observed.      Coordination: Finger-nose-finger without dysmetria.             Data: Pertinent prior to visit   Imaging:      I personally reviewed the above imaging and agree with the findings in the report--normal MRI brain.               Assessment and Plan:   Assessment:  Kallie Olsen is a pleasant 22 year old female who presents in consultation for short term memory loss, post concussive daily chronic headache, anxiety/depression.   We discussed that I think her memory issues are multi factorial. She has overcome a lot re her dyslexia but this could have predisposed her to a more challenging post concussive recovery given that she has had 3 to report.  I do think mood is a large factor.  I focus on improving mood, sleep if applicable and headaches in post concussive patients because I find these to be the most actionable elements besides targeted therapy.  She is tolerating low dose zoloft via Dr. Solo well, are interested in trying higher doses until they can see him again.    Her headache is mixed with some tension, some migrainous components, will try medications in this regard as well as below.   MRI brain reviewed--quite normal.    Overall our goals are: Better headache control, improved mood and see if this can  improve memory complaints.       Plan:  ---Magnesium oxide 400 mg Riboflavin (vitamin b2) 400 mg daily for headache prevetion  --Start gabapentin 300 mg nightly. After about one week, take 300mg twice daily. After another week, take 300mg three times daily for a total of 900mg per day. She was instructed to start this in a few weeks time to let higher dose of zoloft take effect first/be more scientific in this regard. Side effects discussed  --Increase zoloft to 50 mg daily, she will follow up with Dr. Solo in this regard soon as well.    --As she feels she may have failed imitrex previously will trial maxalt 10 mg PRN for bad headache days, side effects also discussed in this regard  Other ideas discussed:  --Ocular PT for continued visual issues post concussively  --Psychology/a therapist.  Therapy + an selective serotonin reuptake inhibitor is the best approach in my opinion.  She will think about this  All questions answered they will follow up with me after previously scheduled neuropsychological testing in October but will reach out with questions in the mean time.                 Neto Balderrama MD   of Neurology   [facility]      The total time of this encounter today amounted to 42 minutes of time on video visit and 62 minutes in total. This time included time spent with the patient, prep work, ordering tests, and performing post visit documentation.

## 2022-06-27 NOTE — PROGRESS NOTES
Kallie is a 22 year old who is being evaluated via a billable video visit.      How would you like to obtain your AVS? Mail a copy  If the video visit is dropped, the invitation should be resent by:verito  Will anyone else be joining your video visit? No

## 2022-06-27 NOTE — LETTER
6/27/2022         RE: Kallie Olsen  [address]  [place] [address]        Dear Colleague,    Thank you for referring your patient, Kallie Olsen, to the [facility] [place]. Please see a copy of my visit note below.    [facility]/[place]  Section of General Neurology  New Patient  Virtual Visit      Kallie Olsen MRN# [id number]   Age: 22 year old YOB: 2000     Requesting physician: No ref. provider found  Earl Solo     Reason for Consultation: Headaches, poor short term memory.         History of Presenting Symptoms:   Kallie Olsen is a 22 year old female who presents today for evaluation of poor short term memory, executive dysfunction  She has a history of headaches, depression, multiple concussions.  She has been put on zoloft for depression didn't tolerate or derive benefit from venlafaxine/buproprion previously, of note.  She has a neuropsychology referral in place in this regard scheduled for October.    Concussion hx:   Last concussion spring 2019--history books fell off a shelf at hit her head.  3 in total  None with LOC     Headache--Since last concussion, but had before then as well to a lesser extent.    Stabbing pains, start at temple, go all the way, occasional phono/photophobia,  Is always nauseous.  Does get visual aura at times.    If she gets a really bad headache she is curled up in a ball, can't move.  Has had to go to ER and migraine cocktail has helped.    Has had issues with coordinating eye movements --visual processing doesn't get to her brain.     She is at a 4 most days re pain levels  She potentially has tried imitrex and it didn't  Mood --Remains not the best.  Zoloft hasn't helped but she is tolerating it OK.  Had trouble connecting with Dr. Solo this morning.    Sleep -- Is fine.  Can stay asleep.  Is tired all day however.  Is getting 7-8 hours a night  Is taking classes right now--at  [employer], working on exercise science degree.    She notes a poor attention span overall.  She falls asleep in class.  She does have dyslexia also.  Has previously worked with a  in this regard too.   Gene testing medicines she would tolerate poorly:  Remeron, luvox, cymbalta, per mom    Memory: Has issues with short term memory she attributes to concussive history.  Though she had to work extra hard with her dyslexia too.      They live in [place]      Past Medical History:     Patient Active Problem List   Diagnosis     Behavior problems     Dyslexia     Helicobacter pylori (H. pylori) infection     Episode of recurrent major depressive disorder (H)     History of multiple concussions     Chronic daily headache     Positive TURNER (antinuclear antibody)     Past Medical History:   Diagnosis Date     Current mild episode of major depressive disorder without prior episode (H) 2/17/2020     Dyslexia 6/12/2014        Past Surgical History:     Past Surgical History:   Procedure Laterality Date     no surgical history          Social History:     Social History     Tobacco Use     Smoking status: Never Smoker     Smokeless tobacco: Never Used   Substance Use Topics     Alcohol use: No     Drug use: No        Family History:     Family History   Problem Relation Age of Onset     Cancer - colorectal Maternal Grandmother      Cancer Maternal Grandmother      Diabetes Maternal Grandmother      Myocardial Infarction Maternal Grandfather      Alzheimer Disease Other         MGGF     Cancer Other         PGGF bladder     Breast Cancer Other         PGGM, great aunts paternal and maternal     Glaucoma No family hx of         Medications:     Current Outpatient Medications   Medication Sig     desogestrel-ethinyl estradiol (APRI) 0.15-30 MG-MCG tablet Take 1 tablet by mouth daily     sertraline (ZOLOFT) 25 MG tablet Take 1 tablet (25 mg) by mouth daily     valACYclovir (VALTREX) 500 MG tablet Take 1 tablet (500  mg) by mouth 2 times daily for 3 days     No current facility-administered medications for this visit.        Allergies:     Allergies   Allergen Reactions     Egg [Chicken-Derived Products (Egg)]      Yolk cause stomach ache, diarrhea, nausea.  Egg whites okay to eat        Review of Systems:   As noted above     Physical Exam:   General: Seated comfortably in no acute distress.  HEENT: Neck with normal range of motion as can be assessed.   Skin: No rashes  Neurologic:     Mental Status: Fully alert, attentive and oriented. Speech clear and fluent.  Can do #s forwards and backwards. Serial 7s: Got 93, then struggled, Repetition intact.  Abstraction intact.  5/5 delayed recall though took some time.       Cranial Nerves: EOMI with normal smooth pursuit--looking up cause some diplopia, this is a chronic problem. Facial movements symmetric. Hearing not formally tested but intact to conversation.  No dysarthria.     Motor: No tremors or other abnormal movements observed.      Coordination: Finger-nose-finger without dysmetria.             Data: Pertinent prior to visit   Imaging:      I personally reviewed the above imaging and agree with the findings in the report--normal MRI brain.               Assessment and Plan:   Assessment:  Kallie Olsen is a pleasant 22 year old female who presents in consultation for short term memory loss, post concussive daily chronic headache, anxiety/depression.   We discussed that I think her memory issues are multi factorial. She has overcome a lot re her dyslexia but this could have predisposed her to a more challenging post concussive recovery given that she has had 3 to report.  I do think mood is a large factor.  I focus on improving mood, sleep if applicable and headaches in post concussive patients because I find these to be the most actionable elements besides targeted therapy.  She is tolerating low dose zoloft via Dr. oSlo well, are interested in trying higher doses until  they can see him again.    Her headache is mixed with some tension, some migrainous components, will try medications in this regard as well as below.   MRI brain reviewed--quite normal.    Overall our goals are: Better headache control, improved mood and see if this can improve memory complaints.       Plan:  ---Magnesium oxide 400 mg Riboflavin (vitamin b2) 400 mg daily for headache prevetion  --Start gabapentin 300 mg nightly. After about one week, take 300mg twice daily. After another week, take 300mg three times daily for a total of 900mg per day. She was instructed to start this in a few weeks time to let higher dose of zoloft take effect first/be more scientific in this regard. Side effects discussed  --Increase zoloft to 50 mg daily, she will follow up with Dr. Solo in this regard soon as well.    --As she feels she may have failed imitrex previously will trial maxalt 10 mg PRN for bad headache days, side effects also discussed in this regard  Other ideas discussed:  --Ocular PT for continued visual issues post concussively  --Psychology/a therapist.  Therapy + an selective serotonin reuptake inhibitor is the best approach in my opinion.  She will think about this  All questions answered they will follow up with me after previously scheduled neuropsychological testing in October but will reach out with questions in the mean time.                 Neto Balderrama MD   of Neurology   [facility]      The total time of this encounter today amounted to 42 minutes of time on video visit and 62 minutes in total. This time included time spent with the patient, prep work, ordering tests, and performing post visit documentation.      Kallie is a 22 year old who is being evaluated via a billable video visit.      How would you like to obtain your AVS? Mail a copy  If the video visit is dropped, the invitation should be resent by:mychart  Will anyone else be joining your video  visit? No                Again, thank you for allowing me to participate in the care of your patient.        Sincerely,        Demarcus Balderrama MD

## 2022-06-28 RX ORDER — SERTRALINE HYDROCHLORIDE 25 MG/1
TABLET, FILM COATED ORAL
Qty: 90 TABLET | Refills: 1 | OUTPATIENT
Start: 2022-06-28

## 2022-07-15 ENCOUNTER — TELEPHONE (OUTPATIENT)
Dept: BEHAVIORAL HEALTH | Facility: CLINIC | Age: 22
End: 2022-07-15

## 2022-07-15 NOTE — TELEPHONE ENCOUNTER
[facility] PHQ-9 Follow-up  Behavioral Health Clinician Triage Service    [facility] PHQ-[facility] Responses:  [facility] Follow-up to PHQ 5/6/2022 6/24/2022 7/9/2022   PHQ-9 9. Suicide Ideation past 2 weeks More than half the days Nearly every day Nearly every day   Thoughts of suicide or self harm in past 2 weeks Yes Yes Yes   Thoughts of suicide or self harm in past 2 weeks Yes Yes Yes   PHQ-9 Self harm plan? No No No   PHQ-9 Self harm action? No No No   PHQ-9 Safety concerns? No No No   PHQ-9 Self harm plan? No No No   PHQ-9 Self harm action? No No No   PHQ-9 Safety concerns? No No No        1st Outreach Date: July 15, 2022 Time: 9:20a  Outcome: Left a message for patient to call [facility].  If patient doesn't return the call the [facility] Pool will make one more phone attempt within 24 hours.    [facility]  2nd Outreach Date: July 15, 2022 Time: 3:20p  Outcome: Completed phone conversation / triage service.  See assessment and disposition below.  Yudelka Wong [facility], [facility] my name is Yudelka.  I m calling from [facility] to follow-up on a questionnaire you completed on [employer].      If it s ok I d like review a few of your symptoms/responses and a talk briefly  about how you have been doing to see if I might be able to provide some support and guidance.       Address/Location of patient: home  Have any supportive people near them? yes, parents      * [facility] spoke with pt this afternoon via phone.  Pt shares that she has had SI off and on for a long time but denies current plan or intent.  She shares that she did not attend her PCP appt earlier this week but did send a My Chart message to PCP indicating she would like a Psychiatry referral placed since one was placed in the past but when she had neurology appt made then the referral ended.  Pt would like to meet with Psychiatry to discuss medications.  Pt shares that she isn't interested at this time in individual therapy as she shares that she doesn't like  talking.    Pt share that she lives with parents and that they monitor her mood very closely.    She did share that she really likes to draw and has thought about Art Therapy.  [facility] will send pt some crisis resources as well as some Art therapy options pt could look into if she is interested.      Risk Assessment:  Patient has had a history of suicidal ideation: hx of SI off and on  Patient reports the following current or recent suicidal ideation or behaviors: SI off and on.  denies current plan or intent.  Patient denies current or recent homicidal ideation or behaviors.  Patient denies current or recent self injurious behavior or ideation.  Patient denies other safety concerns.  Patient reports there are no firearms in the house  Protective Factors Sense of responsibility to family and Positive social support   Risk Factors Intense emotionality    ASQ Assessment:  1. In the past few weeks, have you wished you were dead?  Yes: has SI off and on.  2. In the past few weeks, have you felt that you or your family would be better off if you         were dead?  No  3. In the past week, have you been having thoughts about killing yourself?  No.  Denies specific intent or plan.  4. Have you ever tried to kill yourself?  No.  Denies past attempts.    Disposition:    - Recommendations / Safety Plan: A safety and risk management plan has not been developed at this time, however patient was encouraged to call [facility] should there be a change in any of these risk factors.    [facility] will send pt crisis resources.  She shares that her parents are supportive and check in with her very often.      VENITA Yao on 7/15/2022 at 3:30 PM

## 2022-07-22 DIAGNOSIS — F41.9 ANXIETY: ICD-10-CM

## 2022-07-22 DIAGNOSIS — R51.9 CHRONIC DAILY HEADACHE: ICD-10-CM

## 2022-07-22 NOTE — TELEPHONE ENCOUNTER
Pending Prescriptions:                       Disp   Refills    sertraline (ZOLOFT) 50 MG tablet          30 tab*1            Sig: Take 1 pill daily        Requested Pharmacy: [facility] [place]    Pt's last office visit: 6/27/22  Next scheduled office visit: 10/31/22      Per the RN/LPN medication refill protocol, writer is unable to refill this request.

## 2022-07-22 NOTE — TELEPHONE ENCOUNTER
RN called the pharmacy to confirm that there was one refill remaining and to clarify that she is now on 50mg instead of 25mg.     Shirley Medrano RN Care Coordinator   Neurology/Neurosurgery/PM&R/ Pain Management

## 2022-08-23 DIAGNOSIS — R51.9 CHRONIC DAILY HEADACHE: ICD-10-CM

## 2022-08-23 DIAGNOSIS — F41.9 ANXIETY: ICD-10-CM

## 2022-08-23 NOTE — TELEPHONE ENCOUNTER
Writer left detailed message requesting patient call back to discuss Zoloft refill request. Will inquire if pt plans to see her PCP or [facility] again regarding anxiety and depression. May request that they take over management of this medication going forward. Will wait for return phone call to discuss.       Michelle Hong, RNCC  Neurology/Neurosurgery/PM&R

## 2022-08-23 NOTE — TELEPHONE ENCOUNTER
Pt reports that she has an appt with a  psychiatrist on 9/1/22. She should have enough supply of her Zoloft to last until that appt. No refill needed at this time. Pt advised to call back if she should run out and need a small refill. Pt verbalized understanding.     Michelle Hong, RNCC  Neurology/Neurosurgery/PM&R

## 2022-08-23 NOTE — TELEPHONE ENCOUNTER
Pending Prescriptions:                       Disp   Refills    sertraline (ZOLOFT) 50 MG tablet          30 tab*1            Sig: Take 1 pill daily      Requested Pharmacy: [facility] [place]    Pt's last office visit: 06/27/2022  Next scheduled office visit: 10/31/2022      Per the RN/LPN medication refill protocol, writer is unable to refill this request.       Patrizia Morrison LPN

## 2022-10-03 DIAGNOSIS — R51.9 CHRONIC DAILY HEADACHE: ICD-10-CM

## 2022-10-03 DIAGNOSIS — F41.9 ANXIETY: ICD-10-CM

## 2022-10-03 DIAGNOSIS — F33.2 SEVERE RECURRENT MAJOR DEPRESSION (H): Primary | ICD-10-CM

## 2022-10-03 RX ORDER — GABAPENTIN 100 MG/1
100 CAPSULE ORAL 3 TIMES DAILY
Qty: 90 CAPSULE | Refills: 4 | Status: SHIPPED | OUTPATIENT
Start: 2022-10-03 | End: 2022-12-16

## 2022-10-06 ENCOUNTER — LAB (OUTPATIENT)
Dept: LAB | Facility: CLINIC | Age: 22
End: 2022-10-06
Payer: COMMERCIAL

## 2022-10-06 DIAGNOSIS — F33.2 SEVERE RECURRENT MAJOR DEPRESSION (H): ICD-10-CM

## 2022-10-06 PROCEDURE — 36415 COLL VENOUS BLD VENIPUNCTURE: CPT

## 2022-10-06 PROCEDURE — 84443 ASSAY THYROID STIM HORMONE: CPT

## 2022-10-06 PROCEDURE — 83540 ASSAY OF IRON: CPT

## 2022-10-07 LAB
IRON SERPL-MCNC: 119 UG/DL (ref 35–180)
TSH SERPL DL<=0.005 MIU/L-ACNC: 1.97 MU/L (ref 0.4–4)

## 2022-10-11 ENCOUNTER — MYC REFILL (OUTPATIENT)
Dept: OBGYN | Facility: CLINIC | Age: 22
End: 2022-10-11

## 2022-10-11 DIAGNOSIS — A60.00 GENITAL HERPES SIMPLEX, UNSPECIFIED SITE: ICD-10-CM

## 2022-10-11 RX ORDER — VALACYCLOVIR HYDROCHLORIDE 500 MG/1
500 TABLET, FILM COATED ORAL 2 TIMES DAILY
Qty: 6 TABLET | Refills: 2 | Status: CANCELLED | OUTPATIENT
Start: 2022-10-11

## 2022-10-11 NOTE — TELEPHONE ENCOUNTER
"Requested Prescriptions   Pending Prescriptions Disp Refills     valACYclovir (VALTREX) 500 MG tablet 6 tablet 2     Sig: Take 1 tablet (500 mg) by mouth 2 times daily       Antivirals for Herpes Protocol Failed - 10/11/2022  9:36 AM        Failed - Normal serum creatinine on file in past 12 months     Recent Labs   Lab Test 07/15/20  1758   CR 0.92       Ok to refill medication if creatinine is low          Passed - Patient is age 12 or older        Passed - Recent (12 mo) or future (30 days) visit within the authorizing provider's specialty     Patient has had an office visit with the authorizing provider or a provider within the authorizing providers department within the previous 12 mos or has a future within next 30 days. See \"Patient Info\" tab in inbasket, or \"Choose Columns\" in Meds & Orders section of the refill encounter.              Passed - Medication is active on med list           Pt had a yearly physical with KENDAL Harris CNP, on 3/29/22.  Valtrex 500 mg was prescribed at that time for a 3 day supply with 2 refills.    First, sending pt a "[employer]t response to see if she has already used her 2 refills at the pharmacy.    Pt states she is currently using the original rx for Valtrex.  She has not used the refills yet.    Carmen Duran RN          "

## 2022-10-12 ENCOUNTER — OFFICE VISIT (OUTPATIENT)
Dept: NEUROPSYCHOLOGY | Facility: CLINIC | Age: 22
End: 2022-10-12
Payer: COMMERCIAL

## 2022-10-12 DIAGNOSIS — F41.9 CHRONIC ANXIETY: ICD-10-CM

## 2022-10-12 DIAGNOSIS — F33.2 SEVERE RECURRENT MAJOR DEPRESSION WITHOUT PSYCHOTIC FEATURES (H): ICD-10-CM

## 2022-10-12 DIAGNOSIS — R41.3 COMPLAINT OF MEMORY DISORDER WITHOUT OBSERVED OBJECTIVE MEMORY DEFICIT: Primary | ICD-10-CM

## 2022-10-12 PROCEDURE — 96138 PSYCL/NRPSYC TECH 1ST: CPT | Performed by: CLINICAL NEUROPSYCHOLOGIST

## 2022-10-12 PROCEDURE — 90791 PSYCH DIAGNOSTIC EVALUATION: CPT | Performed by: CLINICAL NEUROPSYCHOLOGIST

## 2022-10-12 PROCEDURE — 96132 NRPSYC TST EVAL PHYS/QHP 1ST: CPT | Performed by: CLINICAL NEUROPSYCHOLOGIST

## 2022-10-12 PROCEDURE — 96139 PSYCL/NRPSYC TST TECH EA: CPT | Performed by: CLINICAL NEUROPSYCHOLOGIST

## 2022-10-12 PROCEDURE — 96133 NRPSYC TST EVAL PHYS/QHP EA: CPT | Performed by: CLINICAL NEUROPSYCHOLOGIST

## 2022-10-13 NOTE — NURSING NOTE
The patient was seen for neuropsychological evaluation at the request of Dr. William Solo , for the purposes of diagnostic clarification and treatment planning. 182 minutes of test administration and scoring were provided by this writer, Uzair Eric. Please see Dr. Max Abdalla's report for a full interpretation of the findings.

## 2022-10-14 NOTE — PROGRESS NOTES
NAME  Kallie Olsen    MRN  [id number]      02     AGE  22     SEX  Female     HANDEDNESS Right     EDUCATION  13     BAIN  10/12/22     PROVIDER  RUSTY     TECH  KB     STATION  OP            ORIENTATION      Time  0     Place      Personal Info.     Presidents             TOMM       Trial 1 50             DCT       ERR: 0      U            E-Score: 7                           WRAT   5     SS %ile GE   Word Reading 99 47 >12.9          WAIS-IV         Raw SS    Similarities  26 11    Block Design 54 12    Digit Span  30 11 RDS=10   Coding  76 11            COWAT   FAS   Raw 40      SS 10      T 45             ANIMAL FLUENCY      Raw 17      SS 9      T 37              BOSTON NAMING TEST     Raw 51 /60     SS 8      T 32             GROOVED PEGBOARD       Raw Drops SSa T   RH 60 0 11 47   LH 80 1 8 35          TRAIL MAKING TEST       Time Errors SSa T   A 19 1 13 58   B 44 0 13 56          TRAIL MAKING TEST       Time Errors SSa %ile   A 19 1 13 0   B 44 0 13 0          STROOP        Raw T     Word 93 42     Color 70 43     C/W 52 57            ELISEO-O COMPLEX FIGURE        Raw T %ile   Time to Copy 168  >16   Copy  35  >16   Short Delay Recall 31 65 93   Long Delay Recall 32 66 95   Recognition Total 21 46 34           AVLT <30   1   T1 T2 T3 T4 T5   5 9 13 15 15   TB T6 30'     6 15 15       Raw M(SD)    Trial 5  15 12.9 (1.8)    Short Delay Recall 15 11.5 (2.3)    30' Recall  15 11.3 (2.5)    30' Recog. Hit/FP 15/0 14.3 (1.1)           BDI-II       Raw 53      Interp. Severe             STAI        Raw %ile Interpretation   State: 66 99 Very Elevated   Trait: 75 100 Very Elevated          MMPI-3       RCd 80 CRIN 39    RC1 93 VRIN 39    RC2 89 CHAS 54T    RC4 48 F 100    RC6 73 Fp 84    RC7 68 Fs 92    RC8 55 FBS 89    RC9 46 RBS 87      L 56      K 35
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
## 2022-10-31 ENCOUNTER — VIRTUAL VISIT (OUTPATIENT)
Dept: NEUROLOGY | Facility: CLINIC | Age: 22
End: 2022-10-31
Payer: COMMERCIAL

## 2022-10-31 DIAGNOSIS — F41.9 ANXIETY: ICD-10-CM

## 2022-10-31 DIAGNOSIS — R51.9 CHRONIC DAILY HEADACHE: Primary | ICD-10-CM

## 2022-10-31 DIAGNOSIS — R41.3 SHORT-TERM MEMORY LOSS: ICD-10-CM

## 2022-10-31 PROCEDURE — 99215 OFFICE O/P EST HI 40 MIN: CPT | Mod: 95 | Performed by: STUDENT IN AN ORGANIZED HEALTH CARE EDUCATION/TRAINING PROGRAM

## 2022-10-31 RX ORDER — RIMEGEPANT SULFATE 75 MG/75MG
TABLET, ORALLY DISINTEGRATING ORAL
COMMUNITY
Start: 2022-10-26 | End: 2023-09-28

## 2022-10-31 RX ORDER — TRAZODONE HYDROCHLORIDE 50 MG/1
TABLET, FILM COATED ORAL
COMMUNITY
Start: 2022-10-11 | End: 2022-12-16

## 2022-10-31 NOTE — LETTER
10/31/2022         RE: Kallie Olsen  [address]  [place] [address]        Dear Colleague,    Thank you for referring your patient, Kallie Olsen, to the [facility] [place]. Please see a copy of my visit note below.    [facility]  Section of General Neurology  Return Patient  Virtual Visit    Kallie Olsen MRN# [id number]   Age: 22 year old YOB: 2000            Assessment and Plan:   Assessment:  Kallie Olesn is a pleasant 22 year old female who is seen on follow up for concussion, headaches, memory issues among other reasons as previously outlined.  We discussed the results of her neuropsychological testing.  She does have some limitations regarding dyslexia and the possibility of ADHD was broached on the basis of the neuropsych report but overall I think as her depression and anxiety and sleep improves I do think her memory will improve concurrently which we discussed.  She is well plugged in now following with both psychology/doing therapy and psychiatry.  She also has a separate pain doctor it would seem and they have made medication changes as below including addition of nurtec which we also discussed would be a good choice for her.  Discussed limited side effect profile, pregnancy considerations (not felt to be safe but is a newer drug with less data, though she is not actively planning or trying to get pregnant).   Overall discussed I would continue trial and error until we can find something that clicks for her regarding headaches, mood, sleep and otherwise.  I think if we can improve her headaches, mood and sleep we could improve her overall quality of life and memory limitations as well. Plan as below, all questions answered.     Plan:  --Giving nurtec more time for headaches as recently started:  Next options: propranolol daily, changing nurtec to injection (ajovy, emgality)   --Magnesium oxide 400 mg  Riboflavin (vitamin b2) 400 mg daily  --Stop gabapentin (side effects  --She will continue to work closely with psychiatry, psychology  --Headache subspecialty referral to discuss botox I think she could be a good candidate for this given how refractory her headaches have been  --Also discussed possibility (after giving it a longer trial) of changing nurtec to injectable options that are similar such as emgality or ajovy e.g. She can reach out in the interim if she would like to trial a different agent  --Sleep hygiene discussed  --Follow up with me in 3-4 months        Neto Balderrama MD   of Neurology   [facility]      Interval history:     Mood is the same, still not great.   Zoloft --seeing a psychiatrist through [facility], now up to  200 mg.  Just started talk therapy:  Is opening up to them.  Had previously resisted therapy x many years per her mother.   Headaches are still the same.  Is seeing a doctor for trigger point injections.    3-4 weeks ago had a really bad migraine, went to ER, migraine cocktail helped.    Discused sleep: she feels she is sleeping way too much, now on trazodone      Nurtec: Prescribed by a separate pain doctor.     Discussed: ajovy, emgality as next options  Gabapentin 300 mg was too much for her (eye issues).  100 mg TID without benefit, 300 at night wasn't tolerable either   Mag/riboflavin: not taking riboflavin, would add this in  Propranolol --discussed this as another next option.    Excedrin migraine does not help, migraine cocktail helped to an extent when she had to go to the ER.   When the headaches are really bad gets very sensitive to light/sound, can't sit up.    Discussed botox as a next option, she has interest in this  Headaches remain every day.  A big issue.    Maxalt as needed--didn't help the pain.    Discussed melatonin  At bedtime prn  In addition to tramadol     Plan at last visit:  ---Magnesium oxide 400  mg Riboflavin (vitamin b2) 400 mg daily for headache prevetion  --Start gabapentin 300 mg nightly. After about one week, take 300mg twice daily. After another week, take 300mg three times daily for a total of 900mg per day. She was instructed to start this in a few weeks time to let higher dose of zoloft take effect first/be more scientific in this regard. Side effects discussed  --Increase zoloft to 50 mg daily, she will follow up with Dr. Solo in this regard soon as well.    --As she feels she may have failed imitrex previously will trial maxalt 10 mg PRN for bad headache days, side effects also discussed in this regard  Other ideas discussed:  --Ocular PT for continued visual issues post concussively  --Psychology/a therapist.  Therapy + an selective serotonin reuptake inhibitor is the best approach in my opinion.  She will think about this  All questions answered they will follow up with me after previously scheduled neuropsychological testing in October but will reach out with questions in the mean time.      Past Medical History:     Patient Active Problem List   Diagnosis     Behavior problems     Dyslexia     Helicobacter pylori (H. pylori) infection     Episode of recurrent major depressive disorder (H)     History of multiple concussions     Chronic daily headache     Positive TURNER (antinuclear antibody)     Past Medical History:   Diagnosis Date     Current mild episode of major depressive disorder without prior episode (H) 2/17/2020     Dyslexia 6/12/2014        Past Surgical History:     Past Surgical History:   Procedure Laterality Date     no surgical history          Social History:     Social History     Tobacco Use     Smoking status: Never     Smokeless tobacco: Never   Substance Use Topics     Alcohol use: No     Drug use: No        Family History:     Family History   Problem Relation Age of Onset     Cancer - colorectal Maternal Grandmother      Cancer Maternal Grandmother      Diabetes  Maternal Grandmother      Myocardial Infarction Maternal Grandfather      Alzheimer Disease Other         MGGF     Cancer Other         PGGF bladder     Breast Cancer Other         PGGM, great aunts paternal and maternal     Glaucoma No family hx of         Medications:     Current Outpatient Medications   Medication Sig     desogestrel-ethinyl estradiol (APRI) 0.15-30 MG-MCG tablet Take 1 tablet by mouth daily     gabapentin (NEURONTIN) 100 MG capsule Take 1 capsule (100 mg) by mouth 3 times daily Take 100 mg three times daily     NURTEC 75 MG TBDP TAKE 1 BY MOUTH EVERY OTHER DAY FOR MIGRAINE PREVENTION.     rizatriptan (MAXALT) 10 MG tablet Take 1 tablet (10 mg) by mouth at onset of headache for migraine May repeat in 2 hours. Max 3 tablets/24 hours.     sertraline (ZOLOFT) 50 MG tablet Take 1 pill daily     traZODone (DESYREL) 50 MG tablet TAKE 0.5-1 TABLET BY MOUTH AT BEDTIME AS NEEDED     valACYclovir (VALTREX) 500 MG tablet Take 1 tablet (500 mg) by mouth 2 times daily for 3 days     No current facility-administered medications for this visit.        Allergies:     Allergies   Allergen Reactions     Egg [Chicken-Derived Products (Egg)]      Yolk cause stomach ache, diarrhea, nausea.  Egg whites okay to eat        Review of Systems:   As noted above     Physical Exam:   General: Seated comfortably in no acute distress.  Neurologic:     Mental Status: Fully alert, attentive and oriented. Speech clear and fluent, no paraphasic errors. Formal testing not repeated.     Cranial Nerves:  Facial movements symmetric. Hearing not formally tested but intact to conversation.  No dysarthria.     Motor: No tremors or other abnormal movements observed.               Data: Pertinent prior to visit   Imaging:  I personally reviewed the above imaging and agree with the findings in the report--normal MRI brain.         10/2022 Neuropsych report reviewed:  IMPRESSIONS     With test performances generally average or better,  results of today s evaluation indicate intact cognition across domains (language, learning and memory, visual processing, attention, processing speed, and executive functioning). There are some slightly lower scores on some language-based tests, likely reflective of her history with dyslexia. One notable finding is the difference in her fine motor speed and dexterity between hands (with dominant right hand significantly outperforming her non-dominant left hand). The neuropsychological literature is clear that 1-2 lower scores in a large battery is a normal finding among the neurologically intact. Overall, there are no significant indications of neurological deficits affecting cognition. With her reported head injuries, none involved losses of consciousness or internal injuries demonstrable on imaging. They are thus classified as minor, uncomplicated injuries and would not be expected to have direct functional impacts that persist for years.      Other facets of her life are likely to impact her daily functioning and perception of her abilities. Firstly, Ms. Olsen reported severe emotional distress, including suicidal ideation. There is a wide diagnostic differential to consider, including unipolar and bipolar mood disorders, anxiety disorders, somatic symptom disorders, and eating disorders. Today s MMPI-3 profile indicates symptom over-reporting and only reduces the diagnostic differential by removing externalizing disorders. She is currently under the care of a psychiatrist and will reportedly begin psychotherapy on 10/13/2022. Additionally, Ms. Olsen reported chronic challenges with sleep (getting approximately 2 hours of sleep per night), eating (feeling sick after eating or not eating), low energy, and pain. All of these factors can contribute to experiences of cognitive struggles in daily life. It is likely that her cognition in daily life would improve should she be able to improve her psychiatric state  and general health.      RECOMMENDATIONS     We were able to reach Ms. Olsen by telephone on 10/14/2022 to discuss the results and recommendations.     1. Ms. Olsen endorsed severe psychiatric symptoms. She should continue to regularly engage in mental healthcare with her psychiatrist and psychotherapist, and she should bring them copies of this report.   2. Her endorsed mood and anxiety symptoms are severe. An inpatient admission for medication optimization and stabilization could be considered.   3. She endorsed a previous suicide attempt and current suicidal ideation (without plans or intentions). If not already in place, a safety plan, as well as strategies toward de-escalating suicidal thoughts/behaviors should be topics for future therapy.   4. Ms. Olsen may consider discussing treatments for severe long-standing depression with her mental healthcare providers. Among these are transcranial magnetic stimulation (TMS), electroconvulsive therapy (ECT), ketamine infusions, and intranasal ketamine.  5. She might benefit from a course in Mindfulness-Based Stress Reduction (MBSR), a therapy approach that was first developed to treat chronic pain patients.   6. She should continue working with her treatment team to assess and treat her concerns about pain, food sensitivities, insomnia, and anergia. Today s test results indicate that psychosomatic factors may be involved, but this should only be considered after appropriate workups fail to discover plausible physical origins for symptoms.   7. Ms. Olsen reported chronic difficulty with sleep which she suspects may be related to a change in gabapentin dosage. She should reach out to her prescribing physician about her suspicion and follow up with a sleep study if symptoms do not improve.   8. Ms. Olsen noted functional difficulties, including remembering to take her medications or retaining new information at work. Additional strategies which may provide  further benefit include:  a. Implementing fixed, predictable structure in the home. For example, place commonly used objects, such as house keys, notepad, or cell phones in the same place every time upon entering the home.  b. Reducing noise and distractions in the environment when needing to concentrate.  c. Avoiding multitasking. Focus on one task at a time until it is complete, then move on to the next task.  d. Breaking larger tasks down into smaller, more-manageable parts that can be focused on one-at-a-time to completion.   e. Using a checklist to help prioritize manageable components of tasks.  f. Allowing additional time for complex tasks.  g. Taking breaks when necessary.  h. Writing down important information (e.g., phone messages, appointment/meeting times and dates).  i. Using a day planner, post-it notes, or cell phone reminders to help monitor upcoming appointments and dates as well as other important tasks.  9. The results of the evaluation constitute a baseline of the patient's cognitive and emotional functioning. If further cognitive difficulties are observed in the future, a referral for a neuropsychological re-evaluation at that time might be considered.         Suad Nunes, PhD  Postdoctoral Neuropsychological Fellow     Max Abdalla, PhD, LP, ABPP-CN  Board Certified in Clinical Neuropsychology  Licensed Psychologist [id number]           The total time of this encounter today amounted to 33 minutes of time on video visit and 45 minutes in total. This time included time spent with the patient, prep work, ordering tests, and performing post visit documentation.      Kallie is a 22 year old who is being evaluated via a billable video visit.      How would you like to obtain your AVS? MyChart  If the video visit is dropped, the invitation should be resent by: Send to e-mail at: [email or web address]  Will anyone else be joining your video visit? No        Video-Visit Details    Video Start Time:  8:58  AM    Type of service:  Video Visit    Video End Time: 9:31 AM    Originating Location (pt. Location): Home        Distant Location (provider location):  Off-site    Platform used for Video Visit: Nabila            Again, thank you for allowing me to participate in the care of your patient.        Sincerely,        Demarcus Balderrama MD

## 2022-10-31 NOTE — PROGRESS NOTES
[facility]  Section of General Neurology  Return Patient  Virtual Visit    Kallie Olsen MRN# [id number]   Age: 22 year old YOB: 2000            Assessment and Plan:   Assessment:  Kallie Olsen is a pleasant 22 year old female who is seen on follow up for concussion, headaches, memory issues among other reasons as previously outlined.  We discussed the results of her neuropsychological testing.  She does have some limitations regarding dyslexia and the possibility of ADHD was broached on the basis of the neuropsych report but overall I think as her depression and anxiety and sleep improves I do think her memory will improve concurrently which we discussed.  She is well plugged in now following with both psychology/doing therapy and psychiatry.  She also has a separate pain doctor it would seem and they have made medication changes as below including addition of nurtec which we also discussed would be a good choice for her.  Discussed limited side effect profile, pregnancy considerations (not felt to be safe but is a newer drug with less data, though she is not actively planning or trying to get pregnant).   Overall discussed I would continue trial and error until we can find something that clicks for her regarding headaches, mood, sleep and otherwise.  I think if we can improve her headaches, mood and sleep we could improve her overall quality of life and memory limitations as well. Plan as below, all questions answered.     Plan:  --Giving nurtec more time for headaches as recently started:  Next options: propranolol daily, changing nurtec to injection (ajovy, emgality)   --Magnesium oxide 400 mg Riboflavin (vitamin b2) 400 mg daily  --Stop gabapentin (side effects  --She will continue to work closely with psychiatry, psychology  --Headache subspecialty referral to discuss botox I think she could be a good candidate for this given how refractory her headaches have  been  --Also discussed possibility (after giving it a longer trial) of changing nurtec to injectable options that are similar such as emgality or ajovy e.g. She can reach out in the interim if she would like to trial a different agent  --Sleep hygiene discussed  --Follow up with me in 3-4 months        Neto Balderrama MD   of Neurology   [facility]      Interval history:     Mood is the same, still not great.   Zoloft --seeing a psychiatrist through [facility], now up to  200 mg.  Just started talk therapy:  Is opening up to them.  Had previously resisted therapy x many years per her mother.   Headaches are still the same.  Is seeing a doctor for trigger point injections.    3-4 weeks ago had a really bad migraine, went to ER, migraine cocktail helped.    Discused sleep: she feels she is sleeping way too much, now on trazodone      Nurtec: Prescribed by a separate pain doctor.     Discussed: ajovy, emgality as next options  Gabapentin 300 mg was too much for her (eye issues).  100 mg TID without benefit, 300 at night wasn't tolerable either   Mag/riboflavin: not taking riboflavin, would add this in  Propranolol --discussed this as another next option.    Excedrin migraine does not help, migraine cocktail helped to an extent when she had to go to the ER.   When the headaches are really bad gets very sensitive to light/sound, can't sit up.    Discussed botox as a next option, she has interest in this  Headaches remain every day.  A big issue.    Maxalt as needed--didn't help the pain.    Discussed melatonin  At bedtime prn  In addition to tramadol     Plan at last visit:  ---Magnesium oxide 400 mg Riboflavin (vitamin b2) 400 mg daily for headache prevetion  --Start gabapentin 300 mg nightly. After about one week, take 300mg twice daily. After another week, take 300mg three times daily for a total of 900mg per day. She was instructed to start this in a few weeks  time to let higher dose of zoloft take effect first/be more scientific in this regard. Side effects discussed  --Increase zoloft to 50 mg daily, she will follow up with Dr. Solo in this regard soon as well.    --As she feels she may have failed imitrex previously will trial maxalt 10 mg PRN for bad headache days, side effects also discussed in this regard  Other ideas discussed:  --Ocular PT for continued visual issues post concussively  --Psychology/a therapist.  Therapy + an selective serotonin reuptake inhibitor is the best approach in my opinion.  She will think about this  All questions answered they will follow up with me after previously scheduled neuropsychological testing in October but will reach out with questions in the mean time.      Past Medical History:     Patient Active Problem List   Diagnosis     Behavior problems     Dyslexia     Helicobacter pylori (H. pylori) infection     Episode of recurrent major depressive disorder (H)     History of multiple concussions     Chronic daily headache     Positive TURNER (antinuclear antibody)     Past Medical History:   Diagnosis Date     Current mild episode of major depressive disorder without prior episode (H) 2/17/2020     Dyslexia 6/12/2014        Past Surgical History:     Past Surgical History:   Procedure Laterality Date     no surgical history          Social History:     Social History     Tobacco Use     Smoking status: Never     Smokeless tobacco: Never   Substance Use Topics     Alcohol use: No     Drug use: No        Family History:     Family History   Problem Relation Age of Onset     Cancer - colorectal Maternal Grandmother      Cancer Maternal Grandmother      Diabetes Maternal Grandmother      Myocardial Infarction Maternal Grandfather      Alzheimer Disease Other         MGGF     Cancer Other         PGGF bladder     Breast Cancer Other         PGGM, great aunts paternal and maternal     Glaucoma No family hx of         Medications:      Current Outpatient Medications   Medication Sig     desogestrel-ethinyl estradiol (APRI) 0.15-30 MG-MCG tablet Take 1 tablet by mouth daily     gabapentin (NEURONTIN) 100 MG capsule Take 1 capsule (100 mg) by mouth 3 times daily Take 100 mg three times daily     NURTEC 75 MG TBDP TAKE 1 BY MOUTH EVERY OTHER DAY FOR MIGRAINE PREVENTION.     rizatriptan (MAXALT) 10 MG tablet Take 1 tablet (10 mg) by mouth at onset of headache for migraine May repeat in 2 hours. Max 3 tablets/24 hours.     sertraline (ZOLOFT) 50 MG tablet Take 1 pill daily     traZODone (DESYREL) 50 MG tablet TAKE 0.5-1 TABLET BY MOUTH AT BEDTIME AS NEEDED     valACYclovir (VALTREX) 500 MG tablet Take 1 tablet (500 mg) by mouth 2 times daily for 3 days     No current facility-administered medications for this visit.        Allergies:     Allergies   Allergen Reactions     Egg [Chicken-Derived Products (Egg)]      Yolk cause stomach ache, diarrhea, nausea.  Egg whites okay to eat        Review of Systems:   As noted above     Physical Exam:   General: Seated comfortably in no acute distress.  Neurologic:     Mental Status: Fully alert, attentive and oriented. Speech clear and fluent, no paraphasic errors. Formal testing not repeated.     Cranial Nerves:  Facial movements symmetric. Hearing not formally tested but intact to conversation.  No dysarthria.     Motor: No tremors or other abnormal movements observed.               Data: Pertinent prior to visit   Imaging:  I personally reviewed the above imaging and agree with the findings in the report--normal MRI brain.         10/2022 Neuropsych report reviewed:  IMPRESSIONS     With test performances generally average or better, results of today s evaluation indicate intact cognition across domains (language, learning and memory, visual processing, attention, processing speed, and executive functioning). There are some slightly lower scores on some language-based tests, likely reflective of her  history with dyslexia. One notable finding is the difference in her fine motor speed and dexterity between hands (with dominant right hand significantly outperforming her non-dominant left hand). The neuropsychological literature is clear that 1-2 lower scores in a large battery is a normal finding among the neurologically intact. Overall, there are no significant indications of neurological deficits affecting cognition. With her reported head injuries, none involved losses of consciousness or internal injuries demonstrable on imaging. They are thus classified as minor, uncomplicated injuries and would not be expected to have direct functional impacts that persist for years.      Other facets of her life are likely to impact her daily functioning and perception of her abilities. Firstly, Ms. Olsen reported severe emotional distress, including suicidal ideation. There is a wide diagnostic differential to consider, including unipolar and bipolar mood disorders, anxiety disorders, somatic symptom disorders, and eating disorders. Today s MMPI-3 profile indicates symptom over-reporting and only reduces the diagnostic differential by removing externalizing disorders. She is currently under the care of a psychiatrist and will reportedly begin psychotherapy on 10/13/2022. Additionally, Ms. Olsen reported chronic challenges with sleep (getting approximately 2 hours of sleep per night), eating (feeling sick after eating or not eating), low energy, and pain. All of these factors can contribute to experiences of cognitive struggles in daily life. It is likely that her cognition in daily life would improve should she be able to improve her psychiatric state and general health.      RECOMMENDATIONS     We were able to reach Ms. Olsen by telephone on 10/14/2022 to discuss the results and recommendations.     1. Ms. Olsen endorsed severe psychiatric symptoms. She should continue to regularly engage in mental healthcare  with her psychiatrist and psychotherapist, and she should bring them copies of this report.   2. Her endorsed mood and anxiety symptoms are severe. An inpatient admission for medication optimization and stabilization could be considered.   3. She endorsed a previous suicide attempt and current suicidal ideation (without plans or intentions). If not already in place, a safety plan, as well as strategies toward de-escalating suicidal thoughts/behaviors should be topics for future therapy.   4. Ms. Olsen may consider discussing treatments for severe long-standing depression with her mental healthcare providers. Among these are transcranial magnetic stimulation (TMS), electroconvulsive therapy (ECT), ketamine infusions, and intranasal ketamine.  5. She might benefit from a course in Mindfulness-Based Stress Reduction (MBSR), a therapy approach that was first developed to treat chronic pain patients.   6. She should continue working with her treatment team to assess and treat her concerns about pain, food sensitivities, insomnia, and anergia. Today s test results indicate that psychosomatic factors may be involved, but this should only be considered after appropriate workups fail to discover plausible physical origins for symptoms.   7. Ms. Olsen reported chronic difficulty with sleep which she suspects may be related to a change in gabapentin dosage. She should reach out to her prescribing physician about her suspicion and follow up with a sleep study if symptoms do not improve.   8. Ms. Olsen noted functional difficulties, including remembering to take her medications or retaining new information at work. Additional strategies which may provide further benefit include:  a. Implementing fixed, predictable structure in the home. For example, place commonly used objects, such as house keys, notepad, or cell phones in the same place every time upon entering the home.  b. Reducing noise and distractions in the  environment when needing to concentrate.  c. Avoiding multitasking. Focus on one task at a time until it is complete, then move on to the next task.  d. Breaking larger tasks down into smaller, more-manageable parts that can be focused on one-at-a-time to completion.   e. Using a checklist to help prioritize manageable components of tasks.  f. Allowing additional time for complex tasks.  g. Taking breaks when necessary.  h. Writing down important information (e.g., phone messages, appointment/meeting times and dates).  i. Using a day planner, post-it notes, or cell phone reminders to help monitor upcoming appointments and dates as well as other important tasks.  9. The results of the evaluation constitute a baseline of the patient's cognitive and emotional functioning. If further cognitive difficulties are observed in the future, a referral for a neuropsychological re-evaluation at that time might be considered.         Suad Nunes, PhD  Postdoctoral Neuropsychological Fellow     Max Abdalla, PhD, LP, ABPP-CN  Board Certified in Clinical Neuropsychology  Licensed Psychologist [id number]           The total time of this encounter today amounted to 33 minutes of time on video visit and 45 minutes in total. This time included time spent with the patient, prep work, ordering tests, and performing post visit documentation.

## 2022-10-31 NOTE — PATIENT INSTRUCTIONS
Giving nurtec more time  Magnesium oxide 400 mg Riboflavin (vitamin b2) 400 mg daily  Stop gabapentin    Next options: propranolol daily, changing nurtec to injection (ajovy, emgality)   I probably would not recommend for you steroid burst but an option    Headache referral for botox consideration.      Reach out if you want to try another option like propranolol daily in between visits.     Stay at it with psychiatry, psychology    Follow up with me in 3-4 months

## 2022-10-31 NOTE — PROGRESS NOTES
Kallie is a 22 year old who is being evaluated via a billable video visit.      How would you like to obtain your AVS? MyChart  If the video visit is dropped, the invitation should be resent by: Send to e-mail at: [email or web address]  Will anyone else be joining your video visit? No        Video-Visit Details    Video Start Time:  8:58 AM    Type of service:  Video Visit    Video End Time: 9:31 AM    Originating Location (pt. Location): Home        Distant Location (provider location):  Off-site    Platform used for Video Visit: Lionexpo

## 2022-11-15 ENCOUNTER — TELEPHONE (OUTPATIENT)
Dept: NEUROLOGY | Facility: CLINIC | Age: 22
End: 2022-11-15

## 2022-11-15 ENCOUNTER — VIRTUAL VISIT (OUTPATIENT)
Dept: NEUROLOGY | Facility: CLINIC | Age: 22
End: 2022-11-15
Payer: COMMERCIAL

## 2022-11-15 DIAGNOSIS — R69 DIAGNOSIS DEFERRED: Primary | ICD-10-CM

## 2022-11-15 DIAGNOSIS — R51.9 CHRONIC DAILY HEADACHE: Primary | ICD-10-CM

## 2022-11-15 PROCEDURE — 99207 PR NO BILLABLE SERVICE THIS VISIT: CPT | Performed by: PSYCHIATRY & NEUROLOGY

## 2022-11-15 RX ORDER — SERTRALINE HYDROCHLORIDE 100 MG/1
200 TABLET, FILM COATED ORAL DAILY
COMMUNITY
Start: 2022-10-11 | End: 2022-12-16

## 2022-11-15 RX ORDER — DULOXETIN HYDROCHLORIDE 30 MG/1
1 CAPSULE, DELAYED RELEASE ORAL SEE ADMIN INSTRUCTIONS
COMMUNITY
Start: 2022-11-10 | End: 2023-02-01

## 2022-11-15 NOTE — TELEPHONE ENCOUNTER
[facility] Call Center    Phone Message    May a detailed message be left on voicemail: yes     Reason for Call: Question regarding specialist protocol  Please follow protocols- only utilize this documentation for questions or concerns that are not clear in the protocol.  Contact clinic directly to clarify question(s) via phone or Kudan message.   Was Clinic Available: no  Question regarding protocol: Dr. Balderrama request headache specialist.  Protocols indicate when asking protocol questions - patient has had a brain injury.    Is there a referral for the requested specialist/specialty? yes  Name of referring provider: [facility]  Location of referring provider: [place]      Action Taken: Message routed to:  [facility]    Travel Screening: Not Applicable
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
## 2022-11-15 NOTE — Clinical Note
Francisco Duque,  I think there was an error in scheduling for your patient. You likely will need to repeat the neurology consultation for migraine clinic.  I will CC Brisa Starks on this message to aide in scheduling the patient soon.  FLORENCIO

## 2022-11-15 NOTE — PROGRESS NOTES
Kallie is a 22 year old who is being evaluated via a billable video visit.      How would you like to obtain your AVS? Mychart  If the video visit is dropped, the invitation should be resent by: [phone]  {If patient encounters technical issues they should call [phone] :[id number]}      Video-Visit Details    Video Start Time: {video visit start/end time for provider to select:005566}    Type of service:  Video Visit    Video End Time:{video visit start/end time for provider to select:218641}    Originating Location (pt. Location): Home    {PROVIDER LOCATION On-site should be selected for visits conducted from your clinic location or adjoining [facility] hospital, academic office, or other nearby [facility] building. Off-site should be selected for all other provider locations, including home:[id number]}    Distant Location (provider location):  {virtual location provider:925902}    Platform used for Video Visit: [email or web address]

## 2022-11-21 ENCOUNTER — VIRTUAL VISIT (OUTPATIENT)
Dept: NEUROLOGY | Facility: CLINIC | Age: 22
End: 2022-11-21
Attending: STUDENT IN AN ORGANIZED HEALTH CARE EDUCATION/TRAINING PROGRAM
Payer: COMMERCIAL

## 2022-11-21 ENCOUNTER — NURSE TRIAGE (OUTPATIENT)
Dept: CALL CENTER | Age: 22
End: 2022-11-21

## 2022-11-21 ENCOUNTER — TELEPHONE (OUTPATIENT)
Dept: NEUROLOGY | Facility: CLINIC | Age: 22
End: 2022-11-21

## 2022-11-21 DIAGNOSIS — G43.709 CHRONIC MIGRAINE WITHOUT AURA WITHOUT STATUS MIGRAINOSUS, NOT INTRACTABLE: Primary | ICD-10-CM

## 2022-11-21 DIAGNOSIS — R51.9 CHRONIC DAILY HEADACHE: ICD-10-CM

## 2022-11-21 PROCEDURE — 99214 OFFICE O/P EST MOD 30 MIN: CPT | Mod: 95 | Performed by: PSYCHIATRY & NEUROLOGY

## 2022-11-21 RX ORDER — RIZATRIPTAN BENZOATE 10 MG/1
10 TABLET ORAL
Qty: 18 TABLET | Refills: 11 | Status: SHIPPED | OUTPATIENT
Start: 2022-11-21 | End: 2023-02-01

## 2022-11-21 RX ORDER — PROCHLORPERAZINE MALEATE 10 MG
10 TABLET ORAL EVERY 6 HOURS PRN
Qty: 10 TABLET | Refills: 11 | Status: SHIPPED | OUTPATIENT
Start: 2022-11-21 | End: 2023-09-28

## 2022-11-21 ASSESSMENT — HEADACHE IMPACT TEST (HIT 6)
WHEN YOU HAVE A HEADACHE HOW OFTEN DO YOU WISH YOU COULD LIE DOWN: ALWAYS
HIT6 TOTAL SCORE: 71
HOW OFTEN HAVE YOU FELT TOO TIRED TO WORK BECAUSE OF YOUR HEADACHES: VERY OFTEN
HOW OFTEN DO HEADACHES LIMIT YOUR DAILY ACTIVITIES: SOMETIMES
WHEN YOU HAVE HEADACHES HOW OFTEN IS THE PAIN SEVERE: ALWAYS
HOW OFTEN DID HEADACHS LIMIT CONCENTRATION ON WORK OR DAILY ACTIVITY: VERY OFTEN
HOW OFTEN HAVE YOU FELT FED UP OR IRRITATED BECAUSE OF YOUR HEADACHES: ALWAYS

## 2022-11-21 ASSESSMENT — MIGRAINE DISABILITY ASSESSMENT (MIDAS)
TOTAL SCORE: 125
HOW MANY DAYS WAS HOUSEWORK PRODUCTIVITY CUT IN HALF DUE TO HEADACHES: 30
ON A SCALE FROM 0-10 ON AVERAGE HOW PAINFUL WERE HEADACHES: 7
HOW MANY DAYS DID YOU NOT DO HOUSEWORK BECAUSE OF HEADACHES: 30
HOW MANY DAYS WAS YOUR PRODUCTIVITY CUT IN HALF BECAUSE OF HEADACHES: 30
HOW OFTEN WERE SOCIAL ACTIVITIES MISSED DUE TO HEADACHES: 20
HOW MANY DAYS IN THE PAST 3 MONTHS HAVE YOU HAD A HEADACHE: 30
HOW MANY DAYS DID YOU MISS WORK OR SCHOOL BECAUSE OF HEADACHES: 15

## 2022-11-21 ASSESSMENT — PATIENT HEALTH QUESTIONNAIRE - PHQ9
10. IF YOU CHECKED OFF ANY PROBLEMS, HOW DIFFICULT HAVE THESE PROBLEMS MADE IT FOR YOU TO DO YOUR WORK, TAKE CARE OF THINGS AT HOME, OR GET ALONG WITH OTHER PEOPLE: VERY DIFFICULT
SUM OF ALL RESPONSES TO PHQ QUESTIONS 1-9: 27
SUM OF ALL RESPONSES TO PHQ QUESTIONS 1-9: 27

## 2022-11-21 NOTE — TELEPHONE ENCOUNTER
Prior Authorization Retail Medication Request    Medication/Dose: Emgality 240 mg loading dose then 120 mg every 28 days  ICD code (if different than what is on RX):  Chronic migraine  Previously Tried and Failed:Diazepam  Naproxen  acetaminophen  She takes Zoloft and Cymbalta for depression and anxiety. These do not help headache.  Gabapentin 300 mg made her feel worse.  Trazodone was stopped, no helpful.  Magnesium 250 mg /riboflavin 200 mg - still taking, not effective  Cyproheptadine 4 mg   Nortriptyline 10-20mg  escitalopram 10 mg  Bupropion 150 mg      Rationale:  She has 30/30 headache days per month, with 2-3/30 severe headache days per month. This has been the pattern for about 3 years.     Insurance Name:  [facility]  Insurance ID:  [id number]     Pharmacy Information (if different than what is on RX)  Name:    Phone:

## 2022-11-21 NOTE — PROGRESS NOTES
Kallie is a 22 year old who is being evaluated via a billable video visit.      How would you like to obtain your AVS? MyChart  If the video visit is dropped, the invitation should be resent by: Send to e-mail at: [email or web address]  Will anyone else be joining your video visit? No        Video-Visit Details    Video Start Time: 11:10 AM    Type of service:  Video Visit    Video End Time:12:00pm    Originating Location (pt. Location): Home        Distant Location (provider location):  On-site    Platform used for Video Visit: MiFi     Answers for HPI/ROS submitted by the patient on 11/21/2022  If you checked off any problems, how difficult have these problems made it for you to do your work, take care of things at home, or get along with other people?: Very difficult  PHQ9 TOTAL SCORE: 27    [facility]   Virtual Headache Neurology Consult  November 21, 2022     Kallie Olsen MRN# [id number]   YOB: 2000 Age: 22 year old     Requesting physician: Demarcus Balderrama MD         Assessment and Recommendations:     Kallie Olsen is a 22 year old female who presents for further evaluation of headache.    Her headache presentation meets criteria for chronic migraine without aura and chronic posttraumatic headache.    Her virtual neurologic examination is intact today.  I did not recommend further evaluation for secondary causes of headache.    We discussed the following symptomatic treatment strategy:  - For acute treatment of headache, I recommend rizatriptan 10 mg taken at the onset of headache, with a repeat dose in 2 hours if needed.  This should not exceed more than 9 days/month to avoid medication overuse.  - For headache related nausea, or as a rescue medicine for migraine, I recommend prochlorperazine 10 mg as needed, not to exceed more than 9 days/month to avoid medication side effects.    Her headache frequency and severity warrant prevention.  She has trialed several medications in  the past, including antidepressants and antiseizure medications, without effect and with significant side effects.  With her history of difficult to control depression, beta-blockers are not recommended.  Similarly, she has difficulty with appetite,  so we will avoid topiramate.  She has previously trialed gabapentin, Zoloft, Lexapro, Wellbutrin, Cymbalta.  -I recommend a trial of Emgality subcutaneous injection every 28 days, starting with a loading dose.  Potential side effects were discussed.  We will attempt to get preauthorization.  I recommend a 3 to 6-month trial prior to determining effectiveness.    As an adjunct to the above, we discussed optimizing her supplement dosing tomagnesium 400 to 800 mg daily, and riboflavin 400 mg daily.      Alysa Klein MD  Neurology            Chief Complaint:     Chief Complaint   Patient presents with     Video Visit     Headaches            History is obtained from the patient and medical record.  Patient was seen via a virtual visit in their home.      Kallie Olsen is a 22 year old female who has been living with headaches since age 16. This occurred at the time of her second concussion. She had a third concussion at age 18 (headaches constant ever since), fourth concussion in 2020. Headaches have worsened over time.     Headaches rate 4/10 at the best, 10/10 at the worst. Severe headaches last about a week. She has 30/30 headache days per month, with 2-3/30 severe headache days per month. This has been the pattern for about 3 years.     Headaches are sharp, stabbing pain in her temples, dull/throbbing pain in the occipital areas, bilaterally, but worse on the left.     She has associated photophobia, phonophobia, nausea, vomiting.     She can have blurred vision with headaches. She denies typical aura. She prefers to lay down, but this headache is not positional. She can have eye tearing on the left with severe headache. She denies fevers or other illness, outside  of concussions.    She describes body numbness, a constant issue.    For treatment, she lays down in her room. Medications can worsen headaches.  She has a list of previous medications that were not helpful:  Diazepam  Naproxen  acetaminophen    For prevention, she takes Nurtec every other day. She started Nurtec about 2 weeks ago. No side effects.     She takes Zoloft and Cymbalta for depression and anxiety. These do not help headache.    Previous trials:  Gabapentin 300 mg made her feel worse.  Trazodone was stopped, no helpful.  Magnesium 250 mg /riboflavin 200 mg - still taking, not effective  Cyproheptadine 4 mg   Nortriptyline 10-20mg  escitalopram 10 mg  Bupropion 150 mg    She saw an eye earlier this year.             Past Medical History:     Past Medical History:   Diagnosis Date     Current mild episode of major depressive disorder without prior episode (H) 2/17/2020     Dyslexia 6/12/2014    concussions          Past Surgical History:     Past Surgical History:   Procedure Laterality Date     no surgical history               Social History:   She attends [employer]. She is studying athletic training.     She works as a  at [employer].     Headaches can affect her ability to work.     She denies caffeine.    Social History     Socioeconomic History     Marital status: Single     Spouse name: Not on file     Number of children: Not on file     Years of education: Not on file     Highest education level: Not on file   Occupational History     Not on file   Tobacco Use     Smoking status: Never     Smokeless tobacco: Never   Substance and Sexual Activity     Alcohol use: No     Drug use: No     Sexual activity: Yes     Partners: Male     Birth control/protection: Pill, Condom   Other Topics Concern     Not on file   Social History Narrative     Not on file     Social Determinants of Health     Financial Resource Strain: Not on file   Food Insecurity: Not on file    Transportation Needs: Not on file   Physical Activity: Not on file   Stress: Not on file   Social Connections: Not on file   Intimate Partner Violence: Not on file   Housing Stability: Not on file             Family History:   Her aunt has headache and TMD.     Family History   Problem Relation Age of Onset     Cancer - colorectal Maternal Grandmother      Cancer Maternal Grandmother      Diabetes Maternal Grandmother      Myocardial Infarction Maternal Grandfather      Alzheimer Disease Other         MGGF     Cancer Other         PGGF bladder     Breast Cancer Other         PGGM, great aunts paternal and maternal     Glaucoma No family hx of              Allergies:      Allergies   Allergen Reactions     Egg [Chicken-Derived Products (Egg)]      Yolk cause stomach ache, diarrhea, nausea.  Egg whites okay to eat   a number of food sensitivities          Medications:     Current Outpatient Medications:      desogestrel-ethinyl estradiol (APRI) 0.15-30 MG-MCG tablet, Take 1 tablet by mouth daily, Disp: 84 tablet, Rfl: 3     DULoxetine (CYMBALTA) 30 MG capsule, Take 1 capsule by mouth See Admin Instructions, Disp: , Rfl:      NURTEC 75 MG TBDP, TAKE 1 BY MOUTH EVERY OTHER DAY FOR MIGRAINE PREVENTION., Disp: , Rfl:      rizatriptan (MAXALT) 10 MG tablet, Take 1 tablet (10 mg) by mouth at onset of headache for migraine May repeat in 2 hours. Max 3 tablets/24 hours., Disp: 9 tablet, Rfl: 4     sertraline (ZOLOFT) 100 MG tablet, Take 200 mg by mouth daily, Disp: , Rfl:      gabapentin (NEURONTIN) 100 MG capsule, Take 1 capsule (100 mg) by mouth 3 times daily Take 100 mg three times daily, Disp: 90 capsule, Rfl: 4     sertraline (ZOLOFT) 50 MG tablet, Take 1 pill daily, Disp: 30 tablet, Rfl: 1     traZODone (DESYREL) 50 MG tablet, TAKE 0.5-1 TABLET BY MOUTH AT BEDTIME AS NEEDED, Disp: , Rfl:      valACYclovir (VALTREX) 500 MG tablet, Take 1 tablet (500 mg) by mouth 2 times daily for 3 days, Disp: 6 tablet, Rfl: 2           Physical Exam:   There were no vitals taken for this visit.   Physical Exam:   General: NAD  Neurologic:   Mental Status Exam: Alert, awake and oriented to situation. No dysarthria. Speech of normal fluency.   Cranial Nerves: Pupils equal, EOMs intact, no nystagmus, facial movements symmetric, hearing intact to conversation, tongue midline and fully mobile. No tongue atrophy or fasciculations.    Motor: No drift in upper extremities. Able to stand from a seated position without use of arms. No tremors or abnormal movements noted.   Coordination: With arms outstretched, able to touch nose using index finger accurately bilaterally.  Normal finger tapping bilaterally.     Gait: Normal stance.    Neck: Normal range of motion with lateral head movements and neck flexion.  Eyes: No conjunctival injection, no scleral icterus.           Data:     MRI brain (3/3/2020):  unremarkable

## 2022-11-21 NOTE — TELEPHONE ENCOUNTER
"Call from , pt answered yes to  PHQ9 .  Reviewed assessment questions. No harm to self or other. May Proceed w/ appt -  notified.      Answer Assessment - Initial Assessment Questions  1. CONCERN: \"What happened that made you call today?\" \"What is your main question or concern about suicide (or depression)?\"        No plan to harm self or others    Depressed, for couple of years- no changes. Does have therapist @  [facility]. Has # for suicide hot line.        2. RISK OF HARM - SUICIDAL ATTEMPT: \"Has your teen tried to harm themselves recently?\" If yes, \"When was this?\"    No    3. RISK OF HARM - SUICIDAL IDEATION: \"Do you ever have thoughts of hurting or killing yourself?\" (e.g., yes, no, no but preoccupation with thoughts about death)  - WISH TO BE DEAD: \"Have you ever wished you were dead or wished you could go to sleep and not wake up?\"  - INTENT: \" Have you had any thoughts of hurting or killing yourself? (e.g., yes, no, N/A) If yes: \"Are you having these thoughts about killing yourself right NOW?\"  - PLAN: If yes: \"Have you thought about how you might do this? Do you have a specific plan in mind?\" (e.g., gun, knife, overdose, hanging, no plan)  - ACCESS: If yes to PLAN, \"Do you have access to ?\" (pills, firearms, knife, etc)     4. RISK OF HARM - SUICIDAL BEHAVIOR: \"Have you ever done anything, started to do anything or prepared to do anything to end your life?\" (collected pills, access to a gun, wrote a note, cut yourself, etc)  No plan at this time, past: cut self and attempted to jump out of window       5. FIREARMS: \"Do you have any guns in your home?\"       6. ONSET: \"When did the suicidal behavior (or depression) begin?\"      2 years ago  7. EVENTS AND STRESSORS: \"Has there been any new stress or recent changes in your life?\" (e.g., recent loss of loved one, negative event, etc)  Aware of stressors, has therapist she is working with,  8. FUNCTIONAL IMPAIRMENT: " "\"How have things been going for you overall?  Have you had any more difficulties than usual doing your normal daily activities?\" (e.g., better, same, worse; self-care, school, work, interactions)    On going  9. RECURRENT SYMPTOMS: \"Have you (or your teen) ever done this before?\" If so, ask: \"When was the last time?\" and \"What happened that time?\"  See above  10. THERAPIST: \"Do you (or your teen) have a counselor or therapist? Name?\"       above    Protocols used: SUICIDE CONCERNS OR DEPRESSION-P-OH      "

## 2022-11-21 NOTE — LETTER
11/21/2022       RE: Kallie Olsen  [address]  [place] [address]     Dear Colleague,    Thank you for referring your patient, Kallie Olsen, to the [facility] [place] at [facility]. Please see a copy of my visit note below.    Kallie is a 22 year old who is being evaluated via a billable video visit.      How would you like to obtain your AVS? MyChart  If the video visit is dropped, the invitation should be resent by: Send to e-mail at: [email or web address]  Will anyone else be joining your video visit? No        Video-Visit Details    Video Start Time: 11:10 AM    Type of service:  Video Visit    Video End Time:12:00pm    Originating Location (pt. Location): Home        Distant Location (provider location):  On-site    Platform used for Video Visit: [employer]     Answers for HPI/ROS submitted by the patient on 11/21/2022  If you checked off any problems, how difficult have these problems made it for you to do your work, take care of things at home, or get along with other people?: Very difficult  PHQ9 TOTAL SCORE: 27    [facility]   Virtual Headache Neurology Consult  November 21, 2022     Kallie Olsen MRN# [id number]   YOB: 2000 Age: 22 year old     Requesting physician: Demarcus Balderrama MD         Assessment and Recommendations:     Kallie Olsen is a 22 year old female who presents for further evaluation of headache.    Her headache presentation meets criteria for chronic migraine without aura and chronic posttraumatic headache.    Her virtual neurologic examination is intact today.  I did not recommend further evaluation for secondary causes of headache.    We discussed the following symptomatic treatment strategy:  - For acute treatment of headache, I recommend rizatriptan 10 mg taken at the onset of headache, with a repeat dose in 2 hours if needed.  This should not exceed more than 9  days/month to avoid medication overuse.  - For headache related nausea, or as a rescue medicine for migraine, I recommend prochlorperazine 10 mg as needed, not to exceed more than 9 days/month to avoid medication side effects.    Her headache frequency and severity warrant prevention.  She has trialed several medications in the past, including antidepressants and antiseizure medications, without effect and with significant side effects.  With her history of difficult to control depression, beta-blockers are not recommended.  Similarly, she has difficulty with appetite,  so we will avoid topiramate.  She has previously trialed gabapentin, Zoloft, Lexapro, Wellbutrin, Cymbalta.  -I recommend a trial of Emgality subcutaneous injection every 28 days, starting with a loading dose.  Potential side effects were discussed.  We will attempt to get preauthorization.  I recommend a 3 to 6-month trial prior to determining effectiveness.    As an adjunct to the above, we discussed optimizing her supplement dosing tomagnesium 400 to 800 mg daily, and riboflavin 400 mg daily.      Alysa Klein MD  Neurology            Chief Complaint:     Chief Complaint   Patient presents with     Video Visit     Headaches            History is obtained from the patient and medical record.  Patient was seen via a virtual visit in their home.      Kallie Olsen is a 22 year old female who has been living with headaches since age 16. This occurred at the time of her second concussion. She had a third concussion at age 18 (headaches constant ever since), fourth concussion in 2020. Headaches have worsened over time.     Headaches rate 4/10 at the best, 10/10 at the worst. Severe headaches last about a week. She has 30/30 headache days per month, with 2-3/30 severe headache days per month. This has been the pattern for about 3 years.     Headaches are sharp, stabbing pain in her temples, dull/throbbing pain in the occipital areas, bilaterally,  but worse on the left.     She has associated photophobia, phonophobia, nausea, vomiting.     She can have blurred vision with headaches. She denies typical aura. She prefers to lay down, but this headache is not positional. She can have eye tearing on the left with severe headache. She denies fevers or other illness, outside of concussions.    She describes body numbness, a constant issue.    For treatment, she lays down in her room. Medications can worsen headaches.  She has a list of previous medications that were not helpful:  Diazepam  Naproxen  acetaminophen    For prevention, she takes Nurtec every other day. She started Nurtec about 2 weeks ago. No side effects.     She takes Zoloft and Cymbalta for depression and anxiety. These do not help headache.    Previous trials:  Gabapentin 300 mg made her feel worse.  Trazodone was stopped, no helpful.  Magnesium 250 mg /riboflavin 200 mg - still taking, not effective  Cyproheptadine 4 mg   Nortriptyline 10-20mg  escitalopram 10 mg  Bupropion 150 mg    She saw an eye earlier this year.             Past Medical History:     Past Medical History:   Diagnosis Date     Current mild episode of major depressive disorder without prior episode (H) 2/17/2020     Dyslexia 6/12/2014    concussions          Past Surgical History:     Past Surgical History:   Procedure Laterality Date     no surgical history               Social History:   She attends [employer]. She is studying athletic training.     She works as a  at [employer].     Headaches can affect her ability to work.     She denies caffeine.    Social History     Socioeconomic History     Marital status: Single     Spouse name: Not on file     Number of children: Not on file     Years of education: Not on file     Highest education level: Not on file   Occupational History     Not on file   Tobacco Use     Smoking status: Never     Smokeless tobacco: Never   Substance and Sexual Activity      Alcohol use: No     Drug use: No     Sexual activity: Yes     Partners: Male     Birth control/protection: Pill, Condom   Other Topics Concern     Not on file   Social History Narrative     Not on file     Social Determinants of Health     Financial Resource Strain: Not on file   Food Insecurity: Not on file   Transportation Needs: Not on file   Physical Activity: Not on file   Stress: Not on file   Social Connections: Not on file   Intimate Partner Violence: Not on file   Housing Stability: Not on file             Family History:   Her aunt has headache and TMD.     Family History   Problem Relation Age of Onset     Cancer - colorectal Maternal Grandmother      Cancer Maternal Grandmother      Diabetes Maternal Grandmother      Myocardial Infarction Maternal Grandfather      Alzheimer Disease Other         MGGF     Cancer Other         PGGF bladder     Breast Cancer Other         PGGM, great aunts paternal and maternal     Glaucoma No family hx of              Allergies:      Allergies   Allergen Reactions     Egg [Chicken-Derived Products (Egg)]      Yolk cause stomach ache, diarrhea, nausea.  Egg whites okay to eat   a number of food sensitivities          Medications:     Current Outpatient Medications:      desogestrel-ethinyl estradiol (APRI) 0.15-30 MG-MCG tablet, Take 1 tablet by mouth daily, Disp: 84 tablet, Rfl: 3     DULoxetine (CYMBALTA) 30 MG capsule, Take 1 capsule by mouth See Admin Instructions, Disp: , Rfl:      NURTEC 75 MG TBDP, TAKE 1 BY MOUTH EVERY OTHER DAY FOR MIGRAINE PREVENTION., Disp: , Rfl:      rizatriptan (MAXALT) 10 MG tablet, Take 1 tablet (10 mg) by mouth at onset of headache for migraine May repeat in 2 hours. Max 3 tablets/24 hours., Disp: 9 tablet, Rfl: 4     sertraline (ZOLOFT) 100 MG tablet, Take 200 mg by mouth daily, Disp: , Rfl:      gabapentin (NEURONTIN) 100 MG capsule, Take 1 capsule (100 mg) by mouth 3 times daily Take 100 mg three times daily, Disp: 90 capsule, Rfl:  4     sertraline (ZOLOFT) 50 MG tablet, Take 1 pill daily, Disp: 30 tablet, Rfl: 1     traZODone (DESYREL) 50 MG tablet, TAKE 0.5-1 TABLET BY MOUTH AT BEDTIME AS NEEDED, Disp: , Rfl:      valACYclovir (VALTREX) 500 MG tablet, Take 1 tablet (500 mg) by mouth 2 times daily for 3 days, Disp: 6 tablet, Rfl: 2          Physical Exam:   There were no vitals taken for this visit.   Physical Exam:   General: NAD  Neurologic:   Mental Status Exam: Alert, awake and oriented to situation. No dysarthria. Speech of normal fluency.   Cranial Nerves: Pupils equal, EOMs intact, no nystagmus, facial movements symmetric, hearing intact to conversation, tongue midline and fully mobile. No tongue atrophy or fasciculations.    Motor: No drift in upper extremities. Able to stand from a seated position without use of arms. No tremors or abnormal movements noted.   Coordination: With arms outstretched, able to touch nose using index finger accurately bilaterally.  Normal finger tapping bilaterally.     Gait: Normal stance.    Neck: Normal range of motion with lateral head movements and neck flexion.  Eyes: No conjunctival injection, no scleral icterus.           Data:     MRI brain (3/3/2020):  unremarkable        Again, thank you for allowing me to participate in the care of your patient.      Sincerely,    Alysa Klein MD

## 2022-11-22 NOTE — TELEPHONE ENCOUNTER
Central Prior Authorization Team   Phone: [phone]      PA Initiation    Medication: Emgality 240 mg loading dose then 120 mg every 28 days  Insurance Company:    Pharmacy Filling the Rx:    Filling Pharmacy Phone:    Filling Pharmacy Fax:    Start Date:

## 2022-11-22 NOTE — TELEPHONE ENCOUNTER
Prior Authorization Approval    Authorization Effective Date: 11/22/2022  Authorization Expiration Date: 5/22/2023  Medication: Emgality 240 mg loading dose then 120 mg every 28 days  Approved Dose/Quantity: 2ml per 1st 30 days, then 1ml per 28 days thereafter  Reference #: [id number]   Insurance Company: [facility] - Phone [phone] Fax [phone]  Expected CoPay:       Which Pharmacy is filling the prescription (Not needed for infusion/clinic administered): [facility] [id number] [place] [facility] [place], [place] - [address] [facility]  Pharmacy Notified: Yes  Patient Notified: No

## 2022-12-16 ENCOUNTER — ANCILLARY PROCEDURE (OUTPATIENT)
Dept: GENERAL RADIOLOGY | Facility: CLINIC | Age: 22
End: 2022-12-16
Attending: INTERNAL MEDICINE
Payer: COMMERCIAL

## 2022-12-16 ENCOUNTER — OFFICE VISIT (OUTPATIENT)
Dept: FAMILY MEDICINE | Facility: CLINIC | Age: 22
End: 2022-12-16
Payer: COMMERCIAL

## 2022-12-16 VITALS
HEART RATE: 90 BPM | BODY MASS INDEX: 19.76 KG/M2 | WEIGHT: 138 LBS | TEMPERATURE: 97.8 F | DIASTOLIC BLOOD PRESSURE: 85 MMHG | HEIGHT: 70 IN | OXYGEN SATURATION: 99 % | SYSTOLIC BLOOD PRESSURE: 116 MMHG | RESPIRATION RATE: 16 BRPM

## 2022-12-16 DIAGNOSIS — M54.2 CERVICAL PAIN: Primary | ICD-10-CM

## 2022-12-16 DIAGNOSIS — Z23 NEED FOR PROPHYLACTIC VACCINATION AND INOCULATION AGAINST INFLUENZA: ICD-10-CM

## 2022-12-16 DIAGNOSIS — M43.9 CURVATURE OF SPINE: ICD-10-CM

## 2022-12-16 DIAGNOSIS — F33.2 SEVERE RECURRENT MAJOR DEPRESSION WITHOUT PSYCHOTIC FEATURES (H): ICD-10-CM

## 2022-12-16 DIAGNOSIS — R63.6 UNDERWEIGHT: ICD-10-CM

## 2022-12-16 DIAGNOSIS — Z23 NEED FOR DIPHTHERIA-TETANUS-PERTUSSIS (TDAP) VACCINE: ICD-10-CM

## 2022-12-16 PROCEDURE — 90715 TDAP VACCINE 7 YRS/> IM: CPT | Performed by: INTERNAL MEDICINE

## 2022-12-16 PROCEDURE — 90472 IMMUNIZATION ADMIN EACH ADD: CPT | Performed by: INTERNAL MEDICINE

## 2022-12-16 PROCEDURE — 72040 X-RAY EXAM NECK SPINE 2-3 VW: CPT | Mod: TC | Performed by: RADIOLOGY

## 2022-12-16 PROCEDURE — 99214 OFFICE O/P EST MOD 30 MIN: CPT | Mod: 25 | Performed by: INTERNAL MEDICINE

## 2022-12-16 PROCEDURE — 90686 IIV4 VACC NO PRSV 0.5 ML IM: CPT | Performed by: INTERNAL MEDICINE

## 2022-12-16 PROCEDURE — 90471 IMMUNIZATION ADMIN: CPT | Performed by: INTERNAL MEDICINE

## 2022-12-16 PROCEDURE — 72070 X-RAY EXAM THORAC SPINE 2VWS: CPT | Mod: TC | Performed by: RADIOLOGY

## 2022-12-16 PROCEDURE — 72100 X-RAY EXAM L-S SPINE 2/3 VWS: CPT | Mod: TC | Performed by: RADIOLOGY

## 2022-12-16 ASSESSMENT — PATIENT HEALTH QUESTIONNAIRE - PHQ9
SUM OF ALL RESPONSES TO PHQ QUESTIONS 1-9: 22
SUM OF ALL RESPONSES TO PHQ QUESTIONS 1-9: 22
10. IF YOU CHECKED OFF ANY PROBLEMS, HOW DIFFICULT HAVE THESE PROBLEMS MADE IT FOR YOU TO DO YOUR WORK, TAKE CARE OF THINGS AT HOME, OR GET ALONG WITH OTHER PEOPLE: SOMEWHAT DIFFICULT

## 2022-12-16 ASSESSMENT — PAIN SCALES - GENERAL: PAINLEVEL: MODERATE PAIN (4)

## 2022-12-16 NOTE — NURSING NOTE
Prior to immunization administration, verified patients identity using patient s name and date of birth. Please see Immunization Activity for additional information.     Screening Questionnaire for Adult Immunization    Are you sick today?   No   Do you have allergies to medications, food, a vaccine component or latex?   No   Have you ever had a serious reaction after receiving a vaccination?   No   Do you have a long-term health problem with heart, lung, kidney, or metabolic disease (e.g., diabetes), asthma, a blood disorder, no spleen, complement component deficiency, a cochlear implant, or a spinal fluid leak?  Are you on long-term aspirin therapy?   No   Do you have cancer, leukemia, HIV/AIDS, or any other immune system problem?   No   Do you have a parent, brother, or sister with an immune system problem?   No   In the past 3 months, have you taken medications that affect  your immune system, such as prednisone, other steroids, or anticancer drugs; drugs for the treatment of rheumatoid arthritis, Crohn s disease, or psoriasis; or have you had radiation treatments?   No   Have you had a seizure, or a brain or other nervous system problem?   No   During the past year, have you received a transfusion of blood or blood    products, or been given immune (gamma) globulin or antiviral drug?   No   For women: Are you pregnant or is there a chance you could become       pregnant during the next month?   No   Have you received any vaccinations in the past 4 weeks?   No     Immunization questionnaire answers were all negative.        Per orders of Dr. Jessica, injection of tap and influenza given by Sana Condon MA. Patient instructed to remain in clinic for 15 minutes afterwards, and to report any adverse reaction to me immediately.       Screening performed by Sana Condon MA on 12/16/2022 at 9:16 AM.

## 2022-12-16 NOTE — PATIENT INSTRUCTIONS
At [facility], we strive to deliver an exceptional experience to you, every time we see you. If you receive a survey, please complete it as we do value your feedback.  If you have MyChart, you can expect to receive results automatically within 24 hours of their completion.  Your provider will send a note interpreting your results as well.   If you do not have MyChart, you should receive your results in about a week by mail.    Your care team:                            Family Medicine Internal Medicine   MD Sam Almanzar MD Shantel Branch-Fleming, MD Srinivasa Vaka, MD Katya Belousova, PAKENDAL Stephens CNP, MD ([facility]   Hermes De La Rosa, MD Fabiana George MD Amelia Massimini APRN MARGARITA Sheldon APRN MD Fiorella Whalen MD          Clinic hours: Monday - Thursday 7 am-6 pm; Fridays 7 am-5 pm.   Urgent care: Monday - Friday 10 am- 8 pm; Saturday and Sunday 9 am-5 pm.    Clinic: [phone]       [facility]: Monday - Thursday 8 am - 7 pm; Friday 8 am - 6 pm  [facility]: [phone]

## 2022-12-16 NOTE — PROGRESS NOTES
[facility] Internal Medicine Progress Note           Assessment and Plan:     Cervical pain  - XR Cervical Spine 2/3 Views    Curvature of spine  - XR Thoracic Spine 2 Views  - XR Lumbar Spine 2/3 Views    Underweight  - DX Body Composition; Future    Need for prophylactic vaccination and inoculation against influenza  - INFLUENZA VACCINE >6 MONTHS (AFLURIA/FLUZONE)    Need for diphtheria-tetanus-pertussis (Tdap) vaccine   TDAP VACCINE (Adacel, Boostrix)  [[id number]]    Severe recurrent major depression without psychotic features (H)  Currently on Duloxetine.           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  Any history of back surgery: no   Any cancer history: no  Precipitating factors:   Worsened by: Bending and Standing.  Alleviating factors:  Improved by: stretching  Therapies Tried and outcome: NSAIDS    Today's PHQ-9         PHQ-9 Total Score: 22    PHQ-9 Q9 Thoughts of better off dead/self-harm past 2 weeks :   Nearly every day  Thoughts of suicide or self harm: (P) No  Self-harm Plan:     Self-harm Action:       Safety concerns for self or others: (P) No    How difficult have these problems made it for you to do your work, take care of things at home, or get along with other people:   Somewhat difficult              Significant Problems:  "  Patient Active Problem List   Diagnosis     Behavior problems     Dyslexia     Helicobacter pylori (H. pylori) infection     Episode of recurrent major depressive disorder (H)     History of multiple concussions     Chronic daily headache     Positive TURNER (antinuclear antibody)              Review of Systems:   CONSTITUTIONAL:POSITIVE  for underweight  INTEGUMENTARY/SKIN: NEGATIVE for worrisome rashes, moles or lesions  EYES: NEGATIVE for vision changes or irritation  ENT/MOUTH: NEGATIVE for ear, mouth and throat problems  RESP: NEGATIVE for significant cough or SOB  BREAST: NEGATIVE for masses, tenderness or discharge  CV: NEGATIVE for chest pain, palpitations or peripheral edema  GI: NEGATIVE for nausea, abdominal pain, heartburn, or change in bowel habits  : NEGATIVE for frequency, dysuria, or hematuria  MUSCULOSKELETAL: NEGATIVE for significant arthralgias or myalgia  NEURO: NEGATIVE for weakness, dizziness or paresthesias  ENDOCRINE: NEGATIVE for temperature intolerance, skin/hair changes  HEME: NEGATIVE for bleeding problems  PSYCHIATRIC: POSITIVE for severe depression.             Physical Exam:   /85 (BP Location: Left arm, Patient Position: Chair, Cuff Size: Adult Regular)   Pulse 90   Temp 97.8  F (36.6  C) (Tympanic)   Resp 16   Ht 1.778 m (5' 10\")   Wt 62.6 kg (138 lb)   LMP 12/15/2022   SpO2 99%   BMI 19.80 kg/m    Constitutional: Awake, alert, cooperative, no apparent distress, and appears stated age.  Eyes: extra-ocular muscles intact and sclera clear  ENT: normocepalic, without obvious abnormality  Back: spinous processes are non-tender on palpation, paraspinous muscles are tender on palpation on palpation of both rhomboid muscles and scoliosis present on upper and lower back.  Lungs: no increased work of breathing and no retractions  Cardiovascular: Not examiend.  Neurologic: Mental Status Exam:  Level of Alertness:   awake  Fund of Knowledge:  normal  Attention/Concentration:  " normal  Language:  normal  Neuropsychiatric: Normal affect, mood, orientation, memory and insight.          Data:   Pending.     Disposition:  Follow-up in 4 weeks.      Sam Jessica MD  Internal Medicine  [facility] Team

## 2022-12-29 ENCOUNTER — ANCILLARY PROCEDURE (OUTPATIENT)
Dept: BONE DENSITY | Facility: CLINIC | Age: 22
End: 2022-12-29
Attending: INTERNAL MEDICINE
Payer: COMMERCIAL

## 2022-12-29 DIAGNOSIS — R63.6 UNDERWEIGHT: ICD-10-CM

## 2022-12-29 PROCEDURE — 76499 UNLISTED DX RADIOGRAPHIC PX: CPT | Performed by: INTERNAL MEDICINE

## 2023-01-08 ENCOUNTER — MYC MEDICAL ADVICE (OUTPATIENT)
Dept: FAMILY MEDICINE | Facility: CLINIC | Age: 23
End: 2023-01-08

## 2023-01-09 NOTE — TELEPHONE ENCOUNTER
Routing to provider to review and advise. Please see Derivixt message below. Patient is looking for advisement for the imaging result of the DX body composition on 12/29/22.    Miracle Bosch RN, BSN  [facility]

## 2023-01-28 ENCOUNTER — DOCUMENTATION ONLY (OUTPATIENT)
Dept: LAB | Facility: CLINIC | Age: 23
End: 2023-01-28
Payer: COMMERCIAL

## 2023-01-28 NOTE — PROGRESS NOTES
Per appt notes, Kallie is due for immune tests from Dr Jessica. Please place orders or contact pt with further info

## 2023-01-30 ENCOUNTER — APPOINTMENT (OUTPATIENT)
Dept: LAB | Facility: CLINIC | Age: 23
End: 2023-01-30
Payer: COMMERCIAL

## 2023-02-01 ENCOUNTER — OFFICE VISIT (OUTPATIENT)
Dept: NEUROLOGY | Facility: CLINIC | Age: 23
End: 2023-02-01
Payer: COMMERCIAL

## 2023-02-01 VITALS
SYSTOLIC BLOOD PRESSURE: 113 MMHG | DIASTOLIC BLOOD PRESSURE: 67 MMHG | BODY MASS INDEX: 20.09 KG/M2 | HEART RATE: 81 BPM | WEIGHT: 140 LBS

## 2023-02-01 DIAGNOSIS — R41.3 SHORT-TERM MEMORY LOSS: ICD-10-CM

## 2023-02-01 DIAGNOSIS — Z87.820 HISTORY OF MULTIPLE CONCUSSIONS: ICD-10-CM

## 2023-02-01 DIAGNOSIS — F41.9 ANXIETY: ICD-10-CM

## 2023-02-01 DIAGNOSIS — G43.709 CHRONIC MIGRAINE WITHOUT AURA WITHOUT STATUS MIGRAINOSUS, NOT INTRACTABLE: Primary | ICD-10-CM

## 2023-02-01 PROCEDURE — 99215 OFFICE O/P EST HI 40 MIN: CPT | Performed by: STUDENT IN AN ORGANIZED HEALTH CARE EDUCATION/TRAINING PROGRAM

## 2023-02-01 RX ORDER — ARIPIPRAZOLE 5 MG/1
TABLET ORAL DAILY
COMMUNITY
Start: 2023-01-29 | End: 2024-08-12

## 2023-02-01 RX ORDER — FLUOXETINE 40 MG/1
40 CAPSULE ORAL DAILY
COMMUNITY
Start: 2023-01-31

## 2023-02-01 ASSESSMENT — PAIN SCALES - GENERAL: PAINLEVEL: MODERATE PAIN (4)

## 2023-02-01 NOTE — LETTER
2/1/2023         RE: Kallie Olsen  [address]  [place] [address]        Dear Colleague,    Thank you for referring your patient, Kallie Olsen, to the [facility] [place]. Please see a copy of my visit note below.    [facility]/Portal  Section of General Neurology  Return Patient Visit    Kallie Olsen MRN# [id number]   Age: 22 year old YOB: 2000            Assessment and Plan:   Assessment:  Kallie Olsen is a pleasant 22 year old female who is seen on follow up for concussion, headaches, memory issues among other reasons.  She is improving since our last visit.  Headaches are better on emgality.  She feels OTC medications help as needed.  I am glad she has made progress in this regard.  She feels her mood is improving to an extent but she still has considerable fatigue and now is sleeping excessively rather than not enough.  She has been able to return to school which I think is a good objective measure of her improvement.  Discussed that the focus should continue to be on mood, sleep and headache control.  I also encouraged her to continue to be active and find consistent hobbies she enjoys.    Also discussed headache medicines/CGRP antagonists in context of pregnancy.  I would recommend against pregnancy/teratogenicity is still being established but not currently felt to be safe     Plan:  Continue emgality, nurtec for migraine prevention  Maxalt or OTC options as needed for headaches (no more than 3 days out of the week for OTC options)  She will keep working hard with her therapist and psychiatrist.    She will continue her studies and try to find eryn/hobbies stay active as best she can  Continue to stay well hydrated  Continue to prioritize sleep  She will continue to follow with Dr. Klein for headache care, her psychology and psychiatry team.  She can reach out with further questions I would be happy to see her back        Neto Balderrama MD   of Neurology   [facility]      Interval history:   Has been since seen in headache clinic by Dr. Klein, started on Emgality   Emgality/nurtec has helped.  Down to 3-4 a week.  She is comfortable with OTC advil/aleve  Mom may have intervened, maxalt as needed.    Abilify and prozac for mood.  Just started the prozac.  titrating off of abilify right now, now off of cymbalta too.      Dance previously made [place] happy[place] [facility] [place] [employer] [facility]    Sleep: going well, tired all the time though.    Is studying athletic training at [employer].    Is working with her therapists.    Is taking magnesium and riboflavin      A/P at last visit  Kallie Olsen is a pleasant 22 year old female who is seen on follow up for concussion, headaches, memory issues among other reasons as previously outlined.  We discussed the results of her neuropsychological testing.  She does have some limitations regarding dyslexia and the possibility of ADHD was broached on the basis of the neuropsych report but overall I think as her depression and anxiety and sleep improves I do think her memory will improve concurrently which we discussed.  She is well plugged in now following with both psychology/doing therapy and psychiatry.  She also has a separate pain doctor it would seem and they have made medication changes as below including addition of nurtec which we also discussed would be a good choice for her.  Discussed limited side effect profile, pregnancy considerations (not felt to be safe but is a newer drug with less data, though she is not actively planning or trying to get pregnant).   Overall discussed I would continue trial and error until we can find something that clicks for her regarding headaches, mood, sleep and otherwise.  I think if we can improve her headaches, mood and sleep we could improve her overall quality of life and memory limitations as well. Plan as  below, all questions answered.     --Giving nurtec more time for headaches as recently started:  Next options: propranolol daily, changing nurtec to injection (ajovy, emgality)   --Magnesium oxide 400 mg Riboflavin (vitamin b2) 400 mg daily  --Stop gabapentin (side effects)  --She will continue to work closely with psychiatry, psychology  --Headache subspecialty referral to discuss botox I think she could be a good candidate for this given how refractory her headaches have been  --Also discussed possibility (after giving it a longer trial) of changing nurtec to injectable options that are similar such as emgality or ajovy e.g. She can reach out in the interim if she would like to trial a different agent  --Sleep hygiene discussed  --Follow up with me in 3-4 months      Past Medical History:     Patient Active Problem List   Diagnosis     Behavior problems     Dyslexia     Helicobacter pylori (H. pylori) infection     Episode of recurrent major depressive disorder (H)     History of multiple concussions     Chronic daily headache     Positive TURNER (antinuclear antibody)     Past Medical History:   Diagnosis Date     Current mild episode of major depressive disorder without prior episode (H) 2/17/2020     Dyslexia 6/12/2014        Past Surgical History:     Past Surgical History:   Procedure Laterality Date     no surgical history          Social History:     Social History     Tobacco Use     Smoking status: Never     Smokeless tobacco: Never   Substance Use Topics     Alcohol use: No     Drug use: No        Family History:     Family History   Problem Relation Age of Onset     Cancer - colorectal Maternal Grandmother      Cancer Maternal Grandmother      Diabetes Maternal Grandmother      Myocardial Infarction Maternal Grandfather      Alzheimer Disease Other         MGGF     Cancer Other         PGGF bladder     Breast Cancer Other         PGGM, great aunts paternal and maternal     Glaucoma No family hx of          Medications:     Current Outpatient Medications   Medication Sig     ARIPiprazole (ABILIFY) 5 MG tablet daily     desogestrel-ethinyl estradiol (APRI) 0.15-30 MG-MCG tablet Take 1 tablet by mouth daily     FLUoxetine (PROZAC) 40 MG capsule daily     galcanezumab-gnlm (EMGALITY) 120 MG/ML injection Inject 1 mL (120 mg) Subcutaneous every 28 days     NURTEC 75 MG TBDP TAKE 1 BY MOUTH EVERY OTHER DAY FOR MIGRAINE PREVENTION.     prochlorperazine (COMPAZINE) 10 MG tablet Take 1 tablet (10 mg) by mouth every 6 hours as needed for nausea or vomiting (migraine)     rizatriptan (MAXALT) 10 MG tablet Take 1 tablet (10 mg) by mouth at onset of headache for migraine May repeat in 2 hours. Max 3 tablets/24 hours.     valACYclovir (VALTREX) 500 MG tablet Take 1 tablet (500 mg) by mouth 2 times daily for 3 days     No current facility-administered medications for this visit.        Allergies:     Allergies   Allergen Reactions     Egg [Chicken-Derived Products (Egg)]      Yolk cause stomach ache, diarrhea, nausea.  Egg whites okay to eat          Physical Exam:   Vitals: /67   Pulse 81   Wt 63.5 kg (140 lb)   LMP 12/15/2022   BMI 20.09 kg/m       Neuro:   General Appearance: No apparent distress, well-nourished, well-groomed, pleasant     Mental Status: Alert and oriented to person, place, and time. Speech fluent and comprehension intact. No dysarthria.    Cranial Nerves:   II: Visual fields: normal  III: Pupils: 3 mm, equal, round, reactive to light   III,IV,VI: Extraocular Movements: intact   V: Facial sensation: intact to light touch  VII: Facial strength: intact without asymmetry  VIII: Hearing: intact grossly  IX: Palate: intact   XII: Tongue movement: normal     Motor Exam:   5/5 diffusely    No abnormal movements. Tone is normal throughout.    Sensory: intact to light touch    Coordination: no dysmetria with finger-to-nose bilaterally    Reflexes: biceps, triceps, brachioradialis, patellar, and ankle jerks 2+ and  symmetric. Toes are downgoing bilaterally    Gait: normal casual gait, normal stride length         Data: Pertinent prior to visit   Imaging:    I personally reviewed the above imaging and agree with the findings in the report--normal MRI brain.               The total time of this encounter today amounted to 40 minutes. This time included time spent with the patient, prep work, ordering tests, and performing post visit documentation.        Again, thank you for allowing me to participate in the care of your patient.        Sincerely,        Demarcus Balderrama MD

## 2023-02-01 NOTE — PATIENT INSTRUCTIONS
Continue emgality, nurtec for migraine prevention  Maxalt or OTC options as needed (no more than 3 days out of the week for OTC options)    Keep working hard with your therapist and psychiatrist.      I'm so happy you are back at school, try to find eryn/hobbies stay active as best you can. ([employer], Yoga)   Keep staying well hydrated  Keep prioritizing sleep    Keep working with Dr. Klein     I think you will keep getting better you are doing an awesome job

## 2023-02-01 NOTE — PROGRESS NOTES
[facility]/[place]  Section of General Neurology  Return Patient Visit    Kallie Olsen MRN# [id number]   Age: 22 year old YOB: 2000            Assessment and Plan:   Assessment:  Kallie Olsen is a pleasant 22 year old female who is seen on follow up for concussion, headaches, memory issues among other reasons.  She is improving since our last visit.  Headaches are better on emgality.  She feels OTC medications help as needed.  I am glad she has made progress in this regard.  She feels her mood is improving to an extent but she still has considerable fatigue and now is sleeping excessively rather than not enough.  She has been able to return to school which I think is a good objective measure of her improvement.  Discussed that the focus should continue to be on mood, sleep and headache control.  I also encouraged her to continue to be active and find consistent hobbies she enjoys.    Also discussed headache medicines/CGRP antagonists in context of pregnancy.  I would recommend against pregnancy/teratogenicity is still being established but not currently felt to be safe     Plan:  Continue emgality, nurtec for migraine prevention  Maxalt or OTC options as needed for headaches (no more than 3 days out of the week for OTC options)  She will keep working hard with her therapist and psychiatrist.    She will continue her studies and try to find eryn/hobbies stay active as best she can  Continue to stay well hydrated  Continue to prioritize sleep  She will continue to follow with Dr. Klein for headache care, her psychology and psychiatry team.  She can reach out with further questions I would be happy to see her back       Neto Balderrama MD   of Neurology   [facility]      Interval history:   Has been since seen in headache clinic by Dr. Klein, started on Emgality   Emgality/nurtec has helped.  Down to 3-4 a week.  She is comfortable with  OTC advil/aleve  Mom may have intervened, maxalt as needed.    Abilify and prozac for mood.  Just started the prozac.  titrating off of abilify right now, now off of cymbalta too.      Dance previously made her happy, [facility] [employer] [facility].    Sleep: going well, tired all the time though.    Is studying athletic training at [employer].    Is working with her therapists.    Is taking magnesium and riboflavin      A/P at last visit  Kallie Olsen is a pleasant 22 year old female who is seen on follow up for concussion, headaches, memory issues among other reasons as previously outlined.  We discussed the results of her neuropsychological testing.  She does have some limitations regarding dyslexia and the possibility of ADHD was broached on the basis of the neuropsych report but overall I think as her depression and anxiety and sleep improves I do think her memory will improve concurrently which we discussed.  She is well plugged in now following with both psychology/doing therapy and psychiatry.  She also has a separate pain doctor it would seem and they have made medication changes as below including addition of nurtec which we also discussed would be a good choice for her.  Discussed limited side effect profile, pregnancy considerations (not felt to be safe but is a newer drug with less data, though she is not actively planning or trying to get pregnant).   Overall discussed I would continue trial and error until we can find something that clicks for her regarding headaches, mood, sleep and otherwise.  I think if we can improve her headaches, mood and sleep we could improve her overall quality of life and memory limitations as well. Plan as below, all questions answered.     --Giving nurtec more time for headaches as recently started:  Next options: propranolol daily, changing nurtec to injection (ajovy, emgality)   --Magnesium oxide 400 mg Riboflavin (vitamin b2) 400 mg daily  --Stop gabapentin (side  effects)  --She will continue to work closely with psychiatry, psychology  --Headache subspecialty referral to discuss botox I think she could be a good candidate for this given how refractory her headaches have been  --Also discussed possibility (after giving it a longer trial) of changing nurtec to injectable options that are similar such as emgality or ajovy e.g. She can reach out in the interim if she would like to trial a different agent  --Sleep hygiene discussed  --Follow up with me in 3-4 months      Past Medical History:     Patient Active Problem List   Diagnosis     Behavior problems     Dyslexia     Helicobacter pylori (H. pylori) infection     Episode of recurrent major depressive disorder (H)     History of multiple concussions     Chronic daily headache     Positive TURNER (antinuclear antibody)     Past Medical History:   Diagnosis Date     Current mild episode of major depressive disorder without prior episode (H) 2/17/2020     Dyslexia 6/12/2014        Past Surgical History:     Past Surgical History:   Procedure Laterality Date     no surgical history          Social History:     Social History     Tobacco Use     Smoking status: Never     Smokeless tobacco: Never   Substance Use Topics     Alcohol use: No     Drug use: No        Family History:     Family History   Problem Relation Age of Onset     Cancer - colorectal Maternal Grandmother      Cancer Maternal Grandmother      Diabetes Maternal Grandmother      Myocardial Infarction Maternal Grandfather      Alzheimer Disease Other         MGGF     Cancer Other         PGGF bladder     Breast Cancer Other         PGGM, great aunts paternal and maternal     Glaucoma No family hx of         Medications:     Current Outpatient Medications   Medication Sig     ARIPiprazole (ABILIFY) 5 MG tablet daily     desogestrel-ethinyl estradiol (APRI) 0.15-30 MG-MCG tablet Take 1 tablet by mouth daily     FLUoxetine (PROZAC) 40 MG capsule daily      galcanezumab-gnlm (EMGALITY) 120 MG/ML injection Inject 1 mL (120 mg) Subcutaneous every 28 days     NURTEC 75 MG TBDP TAKE 1 BY MOUTH EVERY OTHER DAY FOR MIGRAINE PREVENTION.     prochlorperazine (COMPAZINE) 10 MG tablet Take 1 tablet (10 mg) by mouth every 6 hours as needed for nausea or vomiting (migraine)     rizatriptan (MAXALT) 10 MG tablet Take 1 tablet (10 mg) by mouth at onset of headache for migraine May repeat in 2 hours. Max 3 tablets/24 hours.     valACYclovir (VALTREX) 500 MG tablet Take 1 tablet (500 mg) by mouth 2 times daily for 3 days     No current facility-administered medications for this visit.        Allergies:     Allergies   Allergen Reactions     Egg [Chicken-Derived Products (Egg)]      Yolk cause stomach ache, diarrhea, nausea.  Egg whites okay to eat          Physical Exam:   Vitals: /67   Pulse 81   Wt 63.5 kg (140 lb)   LMP 12/15/2022   BMI 20.09 kg/m       Neuro:   General Appearance: No apparent distress, well-nourished, well-groomed, pleasant     Mental Status: Alert and oriented to person, place, and time. Speech fluent and comprehension intact. No dysarthria.    Cranial Nerves:   II: Visual fields: normal  III: Pupils: 3 mm, equal, round, reactive to light   III,IV,VI: Extraocular Movements: intact   V: Facial sensation: intact to light touch  VII: Facial strength: intact without asymmetry  VIII: Hearing: intact grossly  IX: Palate: intact   XII: Tongue movement: normal     Motor Exam:   5/5 diffusely    No abnormal movements. Tone is normal throughout.    Sensory: intact to light touch    Coordination: no dysmetria with finger-to-nose bilaterally    Reflexes: biceps, triceps, brachioradialis, patellar, and ankle jerks 2+ and symmetric. Toes are downgoing bilaterally    Gait: normal casual gait, normal stride length         Data: Pertinent prior to visit   Imaging:    I personally reviewed the above imaging and agree with the findings in the report--normal MRI  brain.               The total time of this encounter today amounted to 40 minutes. This time included time spent with the patient, prep work, ordering tests, and performing post visit documentation.

## 2023-03-20 ASSESSMENT — ENCOUNTER SYMPTOMS
MYALGIAS: 0
HEADACHES: 1
CONSTIPATION: 0
EYE PAIN: 1
FREQUENCY: 0
DYSURIA: 0
NAUSEA: 0
WEAKNESS: 0
PARESTHESIAS: 0
CHILLS: 0
FEVER: 0
COUGH: 0
JOINT SWELLING: 0
SHORTNESS OF BREATH: 0
SORE THROAT: 0
DIZZINESS: 1
HEMATOCHEZIA: 0
DIARRHEA: 0
ARTHRALGIAS: 0
ABDOMINAL PAIN: 0
NERVOUS/ANXIOUS: 0
PALPITATIONS: 0
BREAST MASS: 0
HEARTBURN: 0
HEMATURIA: 0

## 2023-03-20 ASSESSMENT — PATIENT HEALTH QUESTIONNAIRE - PHQ9
SUM OF ALL RESPONSES TO PHQ QUESTIONS 1-9: 27
10. IF YOU CHECKED OFF ANY PROBLEMS, HOW DIFFICULT HAVE THESE PROBLEMS MADE IT FOR YOU TO DO YOUR WORK, TAKE CARE OF THINGS AT HOME, OR GET ALONG WITH OTHER PEOPLE: VERY DIFFICULT
SUM OF ALL RESPONSES TO PHQ QUESTIONS 1-9: 27

## 2023-03-21 ENCOUNTER — OFFICE VISIT (OUTPATIENT)
Dept: OBGYN | Facility: CLINIC | Age: 23
End: 2023-03-21
Payer: COMMERCIAL

## 2023-03-21 VITALS
HEART RATE: 89 BPM | HEIGHT: 70 IN | BODY MASS INDEX: 21.33 KG/M2 | OXYGEN SATURATION: 94 % | SYSTOLIC BLOOD PRESSURE: 124 MMHG | DIASTOLIC BLOOD PRESSURE: 70 MMHG | WEIGHT: 149 LBS

## 2023-03-21 DIAGNOSIS — Z11.3 SCREENING FOR STDS (SEXUALLY TRANSMITTED DISEASES): ICD-10-CM

## 2023-03-21 DIAGNOSIS — Z30.09 BIRTH CONTROL COUNSELING: ICD-10-CM

## 2023-03-21 DIAGNOSIS — A60.00 GENITAL HERPES SIMPLEX, UNSPECIFIED SITE: ICD-10-CM

## 2023-03-21 DIAGNOSIS — F33.2 SEVERE EPISODE OF RECURRENT MAJOR DEPRESSIVE DISORDER, WITHOUT PSYCHOTIC FEATURES (H): ICD-10-CM

## 2023-03-21 DIAGNOSIS — Z01.419 ENCOUNTER FOR GYNECOLOGICAL EXAMINATION WITHOUT ABNORMAL FINDING: Primary | ICD-10-CM

## 2023-03-21 PROCEDURE — 87491 CHLMYD TRACH DNA AMP PROBE: CPT | Performed by: NURSE PRACTITIONER

## 2023-03-21 PROCEDURE — 87591 N.GONORRHOEAE DNA AMP PROB: CPT | Performed by: NURSE PRACTITIONER

## 2023-03-21 PROCEDURE — 99395 PREV VISIT EST AGE 18-39: CPT | Performed by: NURSE PRACTITIONER

## 2023-03-21 RX ORDER — VALACYCLOVIR HYDROCHLORIDE 500 MG/1
500 TABLET, FILM COATED ORAL 2 TIMES DAILY
Qty: 6 TABLET | Refills: 3 | Status: SHIPPED | OUTPATIENT
Start: 2023-03-21 | End: 2024-03-06

## 2023-03-21 RX ORDER — ACETAMINOPHEN AND CODEINE PHOSPHATE 120; 12 MG/5ML; MG/5ML
0.35 SOLUTION ORAL DAILY
Qty: 84 TABLET | Refills: 3 | Status: SHIPPED | OUTPATIENT
Start: 2023-03-21 | End: 2024-02-26

## 2023-03-21 RX ORDER — DESOGESTREL AND ETHINYL ESTRADIOL 0.15-0.03
1 KIT ORAL DAILY
Qty: 84 TABLET | Refills: 3 | Status: CANCELLED | OUTPATIENT
Start: 2023-03-21

## 2023-03-21 ASSESSMENT — COLUMBIA-SUICIDE SEVERITY RATING SCALE - C-SSRS
2. IN THE PAST MONTH, HAVE YOU ACTUALLY HAD ANY THOUGHTS OF KILLING YOURSELF?: NO
1. WITHIN THE PAST MONTH, HAVE YOU WISHED YOU WERE DEAD OR WISHED YOU COULD GO TO SLEEP AND NOT WAKE UP?: YES

## 2023-03-21 NOTE — PATIENT INSTRUCTIONS
If you have any questions regarding your visit, Please contact your care team.     [employer]: [phone]  To Schedule an Appointment 24/7  Call: [phone] HOU[facility] TELEPHONE NUMBER     Minnie Gresham- APRN CNP      VanessaAmelia Saavedra-Surgery Scheduler  [place]-Surgery Scheduler         Monday 7:30 am-5:00 pm    Tuesday 8:00 am-4:00 pm    Wednesday 7:30 am-4:00 pm  [place] [place]    Thursday 8:00 am-11:00 am    Friday 7:30 am-4:00 pm [address] [address] [place] [address] [phone] ask for [facility] [place]  [phone] Fax    Imaging Scheduling all locations  [phone]    [facility]  [address] [place] [address] Dr.  [place], [place] [id number] [phone]         Urgent Care locations:  [facility]   Monday-Friday  10 am - 8 pm  Saturday and Sunday   9 am - 5 pm     [phone]   If you need a medication refill, please contact your pharmacy. Please allow 3 business days for your refill to be completed.  As always, Thank you for trusting us with your healthcare needs!      see additional instructions from your care team below    Get Help in a Crisis in [facility]  Call 911 if you or others are in immediate physical danger and need help from the police, fire department, or an ambulance.     Call [phone] if you or someone close to you is having a mental health crisis. The [facility] Mobile Crisis Response team will help you. The line is open all day, every day, and the call is free. The Crisis Response line is for both adults and children.    Text [place] to 364136 to be connected with a counselor who will help defuse the crisis and connect the texter to local resources. Crisis Text Line is available 24 hours a day, seven days a week.

## 2023-03-21 NOTE — PROGRESS NOTES
SUBJECTIVE:   CC: Kallie is an 22 year old who presents for preventive health visit.     Healthy Habits:     Getting at least 3 servings of Calcium per day:  Yes    Bi-annual eye exam:  Yes    Dental care twice a year:  Yes    Sleep apnea or symptoms of sleep apnea:  Daytime drowsiness    Diet:  Gluten-free/reduced    Frequency of exercise:  2-3 days/week    Duration of exercise:  30-45 minutes    Taking medications regularly:  Yes    Medication side effects:  None    PHQ-2 Total Score: 6    Additional concerns today:  No    Since her last preventive visit, has established care with Neurology and NeuroPsych. Neurology managing chronic daily headaches/migraines without aura. Taking several medications for the headaches, plus an abortive treatment. Has continued on combined oral contraceptive pills.    Patient also follows with psychology and psychiatry for management of major depression.   Denies plan of self harm, but admits to having thoughts.        Today's PHQ-2 Score:   PHQ-2 ( 1999 [employer]) 3/20/2023   Q1: Little interest or pleasure in doing things 3   Q2: Feeling down, depressed or hopeless 3   PHQ-2 Score 6   PHQ-2 Total Score (12-17 Years)- Positive if 3 or more points; Administer PHQ-A if positive -   Q1: Little interest or pleasure in doing things Nearly every day   Q2: Feeling down, depressed or hopeless Nearly every day   PHQ-2 Score 6           Social History     Tobacco Use     Smoking status: Never     Smokeless tobacco: Never   Substance Use Topics     Alcohol use: No         Alcohol Use 3/20/2023   Prescreen: >3 drinks/day or >7 drinks/week? Not Applicable   No flowsheet data found.    Reviewed orders with patient.  Reviewed health maintenance and updated orders accordingly - Yes  Patient Active Problem List   Diagnosis     Behavior problems     Dyslexia     Helicobacter pylori (H. pylori) infection     Episode of recurrent major depressive disorder (H)     History of multiple concussions      Chronic daily headache     Positive TURNER (antinuclear antibody)     Past Surgical History:   Procedure Laterality Date     no surgical history         Social History     Tobacco Use     Smoking status: Never     Smokeless tobacco: Never   Substance Use Topics     Alcohol use: No     Family History   Problem Relation Age of Onset     Cancer - colorectal Maternal Grandmother      Cancer Maternal Grandmother      Diabetes Maternal Grandmother      Myocardial Infarction Maternal Grandfather      Alzheimer Disease Other         MGGF     Cancer Other         PGGF bladder     Breast Cancer Other         PGGM, great aunts paternal and maternal     Glaucoma No family hx of            Breast Cancer Screening:        History of abnormal Pap smear: NO - under age 21, PAP not appropriate for age     Reviewed and updated as needed this visit by clinical staff   Tobacco  Allergies  Meds   Med Hx  Surg Hx  Fam Hx  Soc Hx        Reviewed and updated as needed this visit by Provider                 Past Medical History:   Diagnosis Date     Current mild episode of major depressive disorder without prior episode (H) 2/17/2020     Dyslexia 6/12/2014      Past Surgical History:   Procedure Laterality Date     no surgical history         Review of Systems  CONSTITUTIONAL: NEGATIVE for fever, chills, change in weight  INTEGUMENTARU/SKIN: NEGATIVE for worrisome rashes, moles or lesions  EYES: NEGATIVE for vision changes or irritation  ENT: NEGATIVE for ear, mouth and throat problems  RESP: NEGATIVE for significant cough or SOB  BREAST: NEGATIVE for masses, tenderness or discharge  CV: NEGATIVE for chest pain, palpitations or peripheral edema  GI: NEGATIVE for nausea, abdominal pain, heartburn, or change in bowel habits  : NEGATIVE for unusual urinary or vaginal symptoms. Periods are regular.  MUSCULOSKELETAL: NEGATIVE for significant arthralgias or myalgia  NEURO: NEGATIVE for weakness, dizziness or paresthesias  PSYCHIATRIC: See  "PHQ-9     OBJECTIVE:   /70 (BP Location: Right arm, Patient Position: Sitting, Cuff Size: Adult Regular)   Pulse 89   Ht 1.778 m (5' 10\")   Wt 67.6 kg (149 lb)   LMP 03/16/2023 (Exact Date)   SpO2 94%   BMI 21.38 kg/m    Physical Exam  GENERAL: healthy, alert and no distress  NECK: no adenopathy, no asymmetry, masses, or scars and thyroid normal to palpation  RESP: lungs clear to auscultation - no rales, rhonchi or wheezes  BREAST: normal without masses, tenderness or nipple discharge and no palpable axillary masses or adenopathy  CV: regular rate and rhythm, normal S1 S2, no S3 or S4, no murmur, click or rub, no peripheral edema and peripheral pulses strong  ABDOMEN: soft, nontender, no hepatosplenomegaly, no masses and bowel sounds normal   (female): normal female external genitalia, normal urethral meatus, vaginal mucosa pink, moist, well rugated, and normal cervix/adnexa/uterus without masses or discharge  MS: no gross musculoskeletal defects noted, no edema  SKIN: no suspicious lesions or rashes  NEURO: Normal strength and tone, mentation intact and speech normal  PSYCH: mentation appears normal, affect normal/bright    ASSESSMENT/PLAN:   (Z01.419) Encounter for gynecological examination without abnormal finding  (primary encounter diagnosis)  Comment: Health maintenance reviewed. Plan pap smear after age 21.    (A60.00) Genital herpes simplex, unspecified site  Comment: Uses PRN for outbreaks  Plan: valACYclovir (VALTREX) 500 MG tablet         (Z11.3) Screening for STDs (sexually transmitted diseases)  Plan: NEISSERIA GONORRHOEA PCR, CHLAMYDIA TRACHOMATIS        PCR          (Z30.09) Birth control counseling  Comment: Given her headaches and migraines, would recommend discontinuing combined hormonal medications. Discussed progesterone only options and she elects to remain on a pill. Discussed when to change pills, taking it regularly, use of back up method if late. Discussed possible initial side " effects.  Plan: norethindrone (MICRONOR) 0.35 MG tablet         (F33.2) Severe episode of recurrent major depressive disorder, without psychotic features (H)  Comment: Reviewed her PHQ-9. Patient has mental health providers and also sees Neuropsych. Denies current plan or intent to harm herself. Discussed [facility] crisis line and information provided.    COUNSELING:  Reviewed preventive health counseling, as reflected in patient instructions  Special attention given to:        Contraception       Safe sex practices/STD prevention        She reports that she has never smoked. She has never used smokeless tobacco.      KENDAL Medrano CNP  [facility] [place]  Answers for HPI/ROS submitted by the patient on 3/20/2023  If you checked off any problems, how difficult have these problems made it for you to do your work, take care of things at home, or get along with other people?: Very difficult  PHQ9 TOTAL SCORE: 27    Depression Screening Follow-up    PHQ 3/20/2023   PHQ-9 Total Score 27   Q9: Thoughts of better off dead/self-harm past 2 weeks Nearly every day   F/U: Thoughts of suicide or self-harm Yes   F/U: Self harm-plan No   F/U: Self-harm action No   F/U: Safety concerns No       C-SSRS (Brief [place]) 3/21/2023   Within the last month, have you wished you were dead or wished you could go to sleep and not wake up? Yes   Within the last month, have you had any actual thoughts of killing yourself? No         Follow Up       Follow Up Actions Taken  Referred patient back to mental health provider  [facility] information give    I have reviewed the results of the [place] Screening and proposed plan of care. The patient agrees with the follow up and safety plan.    KENDAL Medrano CNP

## 2023-03-21 NOTE — PROGRESS NOTES
Depression Response    Patient completed the PHQ-9 assessment for depression and scored >9? Yes  Question 9 on the PHQ-9 was positive for suicidality? Yes  Does patient have current mental health provider? Yes    Is this a virtual visit? No    I personally notified the following: visit provider

## 2023-03-22 LAB
C TRACH DNA SPEC QL NAA+PROBE: NEGATIVE
N GONORRHOEA DNA SPEC QL NAA+PROBE: NEGATIVE

## 2023-05-16 ENCOUNTER — TRANSCRIBE ORDERS (OUTPATIENT)
Dept: OTHER | Age: 23
End: 2023-05-16

## 2023-05-16 DIAGNOSIS — G47.00 INSOMNIA: Primary | ICD-10-CM

## 2023-06-06 ENCOUNTER — MYC MEDICAL ADVICE (OUTPATIENT)
Dept: OBGYN | Facility: CLINIC | Age: 23
End: 2023-06-06
Payer: COMMERCIAL

## 2023-06-15 ENCOUNTER — TELEPHONE (OUTPATIENT)
Dept: NEUROLOGY | Facility: CLINIC | Age: 23
End: 2023-06-15

## 2023-06-15 NOTE — TELEPHONE ENCOUNTER
Prior Authorization Retail Medication Request     Medication/Dose: Emgality 240 mg loading dose then 120 mg every 28 days  ICD code (if different than what is on RX):  Chronic migraine  Previously Tried and Failed:Diazepam  Naproxen  acetaminophen  She takes Zoloft and Cymbalta for depression and anxiety. These do not help headache.  Gabapentin 300 mg made her feel worse.  Trazodone was stopped, no helpful.  Magnesium 250 mg /riboflavin 200 mg - still taking, not effective  Cyproheptadine 4 mg   Nortriptyline 10-20mg  escitalopram 10 mg  Bupropion 150 mg       Rationale:  She has 30/30 headache days per month, with 2-3/30 severe headache days per month. This has been the pattern for about 3 years.      Insurance Name:  [facility]  Insurance ID:  [id number]      Pharmacy Information (if different than what is on RX)  Name:    Phone:

## 2023-06-20 NOTE — TELEPHONE ENCOUNTER
Central Prior Authorization Team   Phone: [phone]    PA Initiation    Medication: galcanezumab-gnlm (EMGALITY) 120 MG/ML injection   Insurance Company: [facility] - Phone [phone] Fax [phone]  Pharmacy Filling the Rx: [id number] - [place], [place] - [address]  Filling Pharmacy Phone: [phone]  Filling Pharmacy Fax:    Start Date: 6/20/2023

## 2023-06-20 NOTE — TELEPHONE ENCOUNTER
Prior Authorization Approval    Authorization Effective Date: 6/20/2023  Authorization Expiration Date: 12/20/2023  Medication: galcanezumab-gnlm (EMGALITY) 120 MG/ML injection -PA APPROVED   Approved Dose/Quantity: UP TO 1 AUTO INJECTOR PER 28 DAYS   Reference #:     Insurance Company: [employer] [place] - Phone [phone] Fax [phone]  Expected CoPay:       CoPay Card Available:      Foundation Assistance Needed:    Which Pharmacy is filling the prescription (Not needed for infusion/clinic administered): [facility] [id number] [place] [address]  Pharmacy Notified: Yes- **Instructed pharmacy to notify patient when script is ready to /ship.**  Patient Notified: Yes

## 2023-07-31 ENCOUNTER — TELEPHONE (OUTPATIENT)
Dept: FAMILY MEDICINE | Facility: CLINIC | Age: 23
End: 2023-07-31
Payer: COMMERCIAL

## 2023-07-31 NOTE — TELEPHONE ENCOUNTER
Patient Quality Outreach    Patient is due for the following:   Cervical Cancer Screening - PAP Needed    Next Steps:   Schedule a office visit for pap    Type of outreach:    Sent [employer] message. & Letter      Questions for provider review:    None           Jean Fernandez MA

## 2023-07-31 NOTE — LETTER
July 31, 2023    Kallie Olsen  [address]  [place] [address]    Dear Kallie,    At [facility] we care about your health and are committed to providing quality patient care.     Here is a list of Health Maintenance topics that are due now or due soon:  Health Maintenance Due   Topic Date Due    ADVANCE CARE PLANNING  Never done    DEPRESSION ACTION PLAN  Never done    EYE EXAM  12/21/2017    COVID-19 Vaccine (3 - Pfizer series) 10/08/2021    ANNUAL REVIEW OF HM ORDERS  05/09/2023    PAP  06/18/2023        We are recommending that you:  Schedule a PAP SMEAR EXAM which is due.  Please disregard this reminder if you have had this exam elsewhere within the last year.  It would be helpful for us to have a copy of your recent pap smear report in our file so that we can best coordinate your care.    If you are under/uninsured, we recommend you contact the [facility]. They offer pap smears at no charge or on a sliding fee charge. You can schedule with them at [phone]. Please have them send us the results.    ,    Eye Exam is due:  If this was done within the last 12 months then please contact the clinic or respond to this message with the date and location so we can update your records.    , and   Schedule a Nurse-Only appointment to update your immunizations: Your records indicate that you are not up to date with your immunizations, please schedule a nurse-only appointment to get these updated or update them at your next office visit. If this is incorrect, please disregard.    To schedule an appointment or discuss this further, you may contact us by phone at the [facility] at [phone] or online through the patient [email or web address] @ [email or web address]    Thank you for trusting [facility] and we appreciate the opportunity to serve you.  We look forward to supporting your healthcare needs in the future.    Your partners in  health,      Quality Committee at [facility]

## 2023-09-12 ENCOUNTER — MYC MEDICAL ADVICE (OUTPATIENT)
Dept: FAMILY MEDICINE | Facility: CLINIC | Age: 23
End: 2023-09-12
Payer: COMMERCIAL

## 2023-09-12 ASSESSMENT — SLEEP AND FATIGUE QUESTIONNAIRES
HOW LIKELY ARE YOU TO NOD OFF OR FALL ASLEEP WHILE WATCHING TV: HIGH CHANCE OF DOZING
HOW LIKELY ARE YOU TO NOD OFF OR FALL ASLEEP WHILE LYING DOWN TO REST IN THE AFTERNOON WHEN CIRCUMSTANCES PERMIT: HIGH CHANCE OF DOZING
HOW LIKELY ARE YOU TO NOD OFF OR FALL ASLEEP WHILE SITTING AND READING: HIGH CHANCE OF DOZING
HOW LIKELY ARE YOU TO NOD OFF OR FALL ASLEEP WHILE SITTING AND TALKING TO SOMEONE: SLIGHT CHANCE OF DOZING
HOW LIKELY ARE YOU TO NOD OFF OR FALL ASLEEP IN A CAR, WHILE STOPPED FOR A FEW MINUTES IN TRAFFIC: SLIGHT CHANCE OF DOZING
HOW LIKELY ARE YOU TO NOD OFF OR FALL ASLEEP WHILE SITTING INACTIVE IN A PUBLIC PLACE: HIGH CHANCE OF DOZING
HOW LIKELY ARE YOU TO NOD OFF OR FALL ASLEEP WHEN YOU ARE A PASSENGER IN A CAR FOR AN HOUR WITHOUT A BREAK: HIGH CHANCE OF DOZING
HOW LIKELY ARE YOU TO NOD OFF OR FALL ASLEEP WHILE SITTING QUIETLY AFTER LUNCH WITHOUT ALCOHOL: HIGH CHANCE OF DOZING

## 2023-09-13 ENCOUNTER — OFFICE VISIT (OUTPATIENT)
Dept: SLEEP MEDICINE | Facility: CLINIC | Age: 23
End: 2023-09-13
Attending: PSYCHIATRY & NEUROLOGY
Payer: COMMERCIAL

## 2023-09-13 VITALS
SYSTOLIC BLOOD PRESSURE: 135 MMHG | BODY MASS INDEX: 21.62 KG/M2 | HEIGHT: 70 IN | OXYGEN SATURATION: 97 % | HEART RATE: 86 BPM | DIASTOLIC BLOOD PRESSURE: 78 MMHG | WEIGHT: 151 LBS

## 2023-09-13 DIAGNOSIS — G47.10 EXCESSIVE SLEEPINESS: Primary | ICD-10-CM

## 2023-09-13 PROCEDURE — 99244 OFF/OP CNSLTJ NEW/EST MOD 40: CPT | Performed by: PHYSICIAN ASSISTANT
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
## 2023-09-13 NOTE — LETTER
9/13/2023        RE: Kallie Olsen  [address]  [place] [address]        Outpatient Sleep Medicine Consultation:      Name: Kallie Olsen MRN# [id number]   Age: 23 year old YOB: 2000     Date of Consultation: September 12, 2023  Consultation is requested by: Flor Birmingham MD  [facility] Flor Birmingham  Primary care provider: Earl Solo       Reason for Sleep Consult:     Kallie Olsen is sent by Flor Birmingham for a sleep consultation regarding insomnia.    Patient s Reason for visit  Kallie Olsen main reason for visit: I struggle waking up and i never feel rested when i do. Im always exhausted and it effects my life at school and work  Patient states problem(s) started: 2020?  Kallie Olsen's goals for this visit: To figure out a way to feel rested so its not impacting my normal day           Assessment and Plan:     Summary Sleep Diagnoses and Recommendations:  (G47.10) Excessive sleepiness  (primary encounter diagnosis)  Comment: Kallie presents with a chief concern of excessive daytime sleepiness over at least the last 3 years. She denies any identifiable precipitating event. She has had a number of concussions, but there was about 2 years between the last concussion and the onset of her sleepiness. She has a long standing history of depression, also present well before the sleepiness began. She has felt the depression has been under better control lately, related to TMS, and did not notice a change in her sleepiness with the change in her depression. She is sleeping 8.5-10.5 hours with no difficulty falling asleep or staying asleep. She has frequent napping/inadvertent dozing, and has had drowsy driving (on a long drive). She has had sleep paralysis and hypnagogue, but denies cataplexy (but that could be blocked by fluoxetine). She does not snore and does not have a body habitus suggestive of apnea. She reports  symptoms orthostatic hypotension, but denies hypermobile joints. She had a borderline positive TURNER and has a strong family history of lupus, which could contribute to fatigue. Her degree of sleepiness seems out of proportion to that though.   Plan: Comprehensive Sleep Study        Full sleepiness evaluation, 2 weeks actigraphy, overnight PSG and MSLT. She was advised to arrange weaning off of fluoxetine and aripiprazole if it is felt safe prior to the testing. She felt that would not be a problem. She can resume the medication after the testing.   She does describe postural hypotensive symptoms and she was advised to discuss further evaluation with her PCP.       Comorbid Diagnoses:  Depression, multiple concussions, migraines, borderline positive TURNER    Summary Counseling:    Sleep Testing Reviewed  Obstructive Sleep Apnea Reviewed  Complications of Untreated Sleep Apnea Reviewed      Patient will follow up 2 weeks after sleep study.  Bennett Goltz, PA-C       Total time spent reviewing medical records, history and physical examination, review of previous testing and interpretation as well as documentation on this date: 61 min    CC: MD Earl Ventura MD          History of Present Illness:     Kallie has difficulty getting up in the morning and difficulty staying awake in the day. This has been an issue for a few years. She is not aware of any precipitating event. She denies any difficulty falling asleep or staying asleep. She feels she has about 30 minutes of ok energy before she is ready to go to sleep again. It does not matter if she gets up at 6:30 am or 8:30 am.  Prior to 2020, her sleep schedule was the same prior to the onset of her sleepiness, but it was easier to get up. Her depression has improved lately due to TMS. She has had depression for a lot longer than the sleepiness. The sleepiness did not improve with TMS.   She denies hyper-flexibility. She does have postural  hypotension symptoms. If she stands up quickly, everything goes dark. Her TSH, CBC and metabolic panel were normal,     Past Sleep Evaluations: none    SLEEP-WAKE SCHEDULE:     Work/School Days: Patient goes to school/work: Yes   Usually gets into bed at 10 pm  Takes patient about 2 seconds to fall asleep  Has trouble falling asleep Rarely nights per week  Wakes up in the middle of the night Once times.  Wakes up due to Use the bathroom  She has trouble falling back asleep Rare times a week.   It usually takes 2 seconds to get back to sleep  Patient is usually up at 6:30 am. She sets multiple alarms and sleep through them. She starts alarms at 6 am and is out of bed at 6:30 am.  Uses alarm: Yes    Weekends/Non-work Days/All Other Days:  Usually gets into bed at 10   Takes patient about 2 seconds to fall asleep  Patient is usually up at 8:30 am. She works daily but starts later on weekends. She starts setting alarms at 8 am.   Uses alarm: Yes    She thinks she would wake naturally around 3 PM. She feels she starts hitting a wall around 2 PM. That feeling lasts all day.    Sleep Need  Patient gets  8 hours sleep on average   Patient thinks she needs about A lot more if im always tired sleep    Kallie Olsen prefers to sleep in this position(s): Back   Patient states they do the following activities in bed: Watch TV;Use phone, computer, or tablet - 30 min or less. She usually has a show running and falls asleep to it.    Naps  Patient takes a purposeful nap Most days during my break because I can t stay awake times a week and naps are usually 45 min in duration. She goes to her car and sets an alarm.  She feels better after a nap: No  She dozes off unintentionally All the time days per week. She dozes in school or at work.  Patient has had a driving accident or near-miss due to sleepiness/drowsiness: Yes, she has almost gotten into an accident. She was driving to [place] after lunch.       SLEEP  DISRUPTIONS:    Breathing/Snoring  Patient snores:No  Other people complain about her snoring: No  Patient has been told she stops breathing in her sleep:No  She has issues with the following: Morning headaches- she had 4 concussions between 7th grade and 2018. No nocturnal heartburn or reflux. No difficulty with nasal breathing.     Movement:  Patient gets pain, discomfort, with an urge to move:  No restless legs.  It happens when she is resting:  No  It happens more at night:  No  Patient has been told she kicks her legs at night:  No     Behaviors in Sleep:  Kallie Olsen has experienced the following behaviors while sleeping: Kicking or punching;Waking up paralyzed. She has woken up punching the air or biting something, not very often. She once woke up biting her partner on the back. She has hit the wall. She does not remember the dream. Those events occurred last year. Waking up feeling paralyzed is rare. She does see a demon in front of her.  Pt denies bruxism, sleep talking, sleep walking, and dream enactment behavior. Pt denies cataplexy.       Is there anything else you would like your sleep provider to know: I often wake up to punching or biting      CAFFEINE AND OTHER SUBSTANCES:    Patient consumes caffeinated beverages per day:  0 she does not like it. She mentions addiction runs in her family.   Last caffeine use is usually: 0  List of any prescribed or over the counter stimulants that patient takes: 0 She may have tried bupropion, but does not remember how she responded.  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  She is in school getting an associates  degree. She has classes online, whenever she has time.     SCALES:    EPWORTH SLEEPINESS SCALE         9/12/2023     8:02 AM    [place] Sleepiness Scale ( PANFILO Darnell  [id number]<br>ESS - [place]/English - Final version - 21 Nov 07 - [facility] [place].)   Sitting and reading High chance of dozing   Watching TV High chance of dozing   Sitting, inactive in a public place (e.g. a theatre or a meeting) High chance of dozing   As a passenger in a car for an hour without a break High chance of dozing   Lying down to rest in the afternoon when circumstances permit High chance of dozing   Sitting and talking to someone Slight chance of dozing   Sitting quietly after a lunch without alcohol High chance of dozing   In a car, while stopped for a few minutes in traffic Slight chance of dozing   [place] Score (MC) 20   [place] Score (Sleep) 20         INSOMNIA SEVERITY INDEX (PRISCILLA)          9/12/2023     7:44 AM   Insomnia Severity Index (PRISCILLA)   Difficulty falling asleep 0   Difficulty staying asleep 0   Problems waking up too early 0   How SATISFIED/DISSATISFIED are you with your CURRENT sleep pattern? 2   How NOTICEABLE to others do you think your sleep problem is in terms of impairing the quality of your life? 3   How WORRIED/DISTRESSED are you about your current sleep problem? 3   To what extent do you consider your sleep problem to INTERFERE with your daily functioning (e.g. daytime fatigue, mood, ability to function at work/daily chores, concentration, memory, mood, etc.) CURRENTLY? 4   PRISCILLA Total Score 12       Guidelines for Scoring/Interpretation:  Total score categories:  0-7 = No clinically significant insomnia   8-14 = Subthreshold insomnia   15-21 = Clinical insomnia (moderate severity)  22-28 = Clinical insomnia (severe)  Used via courtesy of www.Advanced Orthopedic Technologiesealth.va.gov with permission from Arturo Bojorquez PhD., [facility]      STOP BANG         9/12/2023     8:02 AM   STOP BANG Questionnaire (  2008, the American  "Society of Anesthesiologists, Inc. [employer]   1. Snoring - Do you snore loudly (louder than talking or loud enough to be heard through closed doors)? No   2. Tired - Do you often feel tired, fatigued, or sleepy during daytime? Yes   3. Observed - Has anyone observed you stop breathing during your sleep? No   4. Blood pressure - Do you have or are you being treated for high blood pressure? No   5. BMI - BMI more than 35 kg/m2? No   6. Age - Age over 50 yr old? No   7. Neck circumference - Neck circumference greater than 40 cm? No   8. Gender - Gender male? No   STOP BANG Score (MC): 2 (Low risk of MARIEL)         GAD7        7/9/2022     9:54 AM   SARAI-7    1. Feeling nervous, anxious, or on edge 3   2. Not being able to stop or control worrying 3   3. Worrying too much about different things 3   4. Trouble relaxing 3   5. Being so restless that it is hard to sit still 3   6. Becoming easily annoyed or irritable 3   7. Feeling afraid, as if something awful might happen 3   SARAI-7 Total Score 21         CAGE-AID         No data to display                  CAGE-AID reprinted with permission from the [place] Medical Journal, RUDDY Fair. and PERLA Hdz, \"Conjoint screening questionnaires for alcohol and drug abuse\" [place] Medical Journal 94: 135-140, 1995.      PATIENT HEALTH QUESTIONNAIRE-9 (PHQ - 9)        3/20/2023    10:54 PM   PHQ-9 ([employer])   1.  Little interest or pleasure in doing things 3   2.  Feeling down, depressed, or hopeless 3   3.  Trouble falling or staying asleep, or sleeping too much 3   4.  Feeling tired or having little energy 3   5.  Poor appetite or overeating 3   6.  Feeling bad about yourself - or that you are a failure or have let yourself or your family down 3   7.  Trouble concentrating on things, such as reading the newspaper or watching television 3   8.  Moving or speaking so slowly that other people could have noticed. Or the opposite - being so fidgety or " restless that you have been moving around a lot more than usual 3   9.  Thoughts that you would be better off dead, or of hurting yourself in some way 3   PHQ-9 Total Score 27   6.  Feeling bad about yourself 3   7.  Trouble concentrating 3   8.  Moving slowly or restless 3   9.  Suicidal or self-harm thoughts 3   In the past two weeks have you had thoughts of suicide or self harm? Yes   Do you have concerns about your personal safety or the safety of others? No   In the past 2 weeks have you thought about a plan or had intention to harm yourself? No   In the past 2 weeks have you acted on these thoughts in any way? No   1.  Little interest or pleasure in doing things Nearly every day   2.  Feeling down, depressed, or hopeless Nearly every day   3.  Trouble falling or staying asleep, or sleeping too much Nearly every day   4.  Feeling tired or having little energy Nearly every day   5.  Poor appetite or overeating Nearly every day   6.  Feeling bad about yourself Nearly every day   7.  Trouble concentrating Nearly every day   8.  Moving slowly or restless Nearly every day   9.  Suicidal or self-harm thoughts Nearly every day (BPA)   PHQ-9 via produkte[employer].com TOTAL SCORE-----> 27 (Severe depression)   Difficulty at work, home, or with people Very difficult   F/U: Thoughts of suicide or self harm? Yes   F/U: Plan or intention for self harm? No   F/U: Acted on thoughts? No   F/U: Safety concerns for self or others? No       Developed by Oralia Steiner, Irish Fuller, Christopher Berry and colleagues, with an educational danielito from [employer]. No permission required to reproduce, translate, display or distribute.        Allergies:    Allergies   Allergen Reactions     Egg [Chicken-Derived Products (Egg)]      Yolk cause stomach ache, diarrhea, nausea.  Egg whites okay to eat       Medications:    Current Outpatient Medications   Medication Sig Dispense Refill     ARIPiprazole (ABILIFY) 5 MG tablet daily        FLUoxetine (PROZAC) 40 MG capsule daily       galcanezumab-gnlm (EMGALITY) 120 MG/ML injection Inject 1 mL (120 mg) Subcutaneous every 28 days 1 mL 11     norethindrone (MICRONOR) 0.35 MG tablet Take 1 tablet (0.35 mg) by mouth daily 84 tablet 3     NURTEC 75 MG TBDP TAKE 1 BY MOUTH EVERY OTHER DAY FOR MIGRAINE PREVENTION.       prochlorperazine (COMPAZINE) 10 MG tablet Take 1 tablet (10 mg) by mouth every 6 hours as needed for nausea or vomiting (migraine) 10 tablet 11     rizatriptan (MAXALT) 10 MG tablet Take 1 tablet (10 mg) by mouth at onset of headache for migraine May repeat in 2 hours. Max 3 tablets/24 hours. 9 tablet 4     valACYclovir (VALTREX) 500 MG tablet Take 1 tablet (500 mg) by mouth 2 times daily for 3 days 6 tablet 3       Problem List:  Patient Active Problem List    Diagnosis Date Noted     Positive TURNER (antinuclear antibody) 07/28/2020     Priority: Medium     Episode of recurrent major depressive disorder (H) 02/17/2020     Priority: Medium     History of multiple concussions 02/17/2020     Priority: Medium     Chronic daily headache 02/17/2020     Priority: Medium     Helicobacter pylori (H. pylori) infection 10/03/2019     Priority: Medium     Dyslexia 06/12/2014     Priority: Medium     Behavior problems 10/15/2012     Priority: Medium     Saw [facility] in clinic and referred to psychology.          Past Medical/Surgical History:  Past Medical History:   Diagnosis Date     Current mild episode of major depressive disorder without prior episode (H) 2/17/2020     Dyslexia 6/12/2014     Past Surgical History:   Procedure Laterality Date     no surgical history         Social History:  Social History     Socioeconomic History     Marital status: Single     Spouse name: Not on file     Number of children: Not on file     Years of education: Not on file     Highest education level: Not on file   Occupational History     Not on file   Tobacco Use     Smoking status: Never     Smokeless tobacco: Never  "  Substance and Sexual Activity     Alcohol use: No     Drug use: No     Sexual activity: Yes     Partners: Male     Birth control/protection: Pill, Condom   Other Topics Concern     Not on file   Social History Narrative     Not on file     Social Determinants of Health     Financial Resource Strain: Not on file   Food Insecurity: Not on file   Transportation Needs: Not on file   Physical Activity: Not on file   Stress: Not on file   Social Connections: Not on file   Intimate Partner Violence: Not on file   Housing Stability: Not on file       Family History:  Family History   Problem Relation Age of Onset     Cancer - colorectal Maternal Grandmother      Cancer Maternal Grandmother      Diabetes Maternal Grandmother      Myocardial Infarction Maternal Grandfather      Alzheimer Disease Other         MGGF     Cancer Other         PGGF bladder     Breast Cancer Other         PGGM, great aunts paternal and maternal     Glaucoma No family hx of        Review of Systems:  A complete review of systems reviewed by me is negative with the exeption of what has been mentioned in the history of present illness.  In the last TWO WEEKS have you experienced any of the following symptoms?  Fevers: No  Night Sweats: Yes  Weight Gain: No  Pain at Night: No  Double Vision: No  Changes in Vision: No  Difficulty Breathing through Nose: No  Sore Throat in Morning: No  Dry Mouth in the Morning: No  Shortness of Breath Lying Flat: No  Shortness of Breath With Activity: No  Awakening with Shortness of Breath: No  Increased Cough: No  Heart Racing at Night: No  Swelling in Feet or Legs: No  Diarrhea at Night: No  Heartburn at Night: No  Urinating More than Once at Night: No  Losing Control of Urine at Night: No  Joint Pains at Night: No  Headaches in Morning: Yes  Weakness in Arms or Legs: No  Depressed Mood: Yes  Anxiety: Yes     Physical Examination:  Vitals: /78   Pulse 86   Ht 1.778 m (5' 10\")   Wt 68.5 kg (151 lb)   SpO2 " 97%   BMI 21.67 kg/m        Neck Cir (cm): 36 cm      GENERAL APPEARANCE: healthy, alert, no distress, and cooperative  EYES: Eyes grossly normal to inspection, PERRL, conjunctivae and sclerae normal, lids and lashes normal, and wearing glasses  HENT: oral mucous membranes moist and oropharynx clear  NECK: no adenopathy, no asymmetry, masses, or scars, thyroid normal to palpation, and trachea midline and normal to palpation  RESP: lungs clear to auscultation - no rales, rhonchi or wheezes  CV: regular rates and rhythm, no murmur, click or rub, and no irregular beats  LYMPHATICS: no cervical adenopathy  MS: extremities normal- no gross deformities noted  NEURO: Normal strength and tone, mentation intact, and speech normal  Mallampati Class: I.  Tonsillar Stage: 1  hidden by pillars.         Data: All pertinent previous laboratory data reviewed     Recent Labs   Lab Test 07/15/20  1758 10/01/19  1704    139   POTASSIUM 4.2 4.5   CHLORIDE 106 104   CO2 27 29   ANIONGAP 6 6   GLC 97 90   BUN 8 16   CR 0.92 1.06*   ALMAS 9.7 9.5       Recent Labs   Lab Test 07/15/20  1758   WBC 6.2   RBC 4.98   HGB 14.7   HCT 43.5   MCV 87   MCH 29.5   MCHC 33.8   RDW 11.7          Recent Labs   Lab Test 07/15/20  1758   PROTTOTAL 7.2   ALBUMIN 3.9   BILITOTAL 0.3   ALKPHOS 59   AST 11   ALT 19       TSH (mU/L)   Date Value   10/06/2022 1.97   07/15/2020 0.98       No results found for: UAMP, UBARB, BENZODIAZEUR, UCANN, UCOC, OPIT, UPCP    No results found for: IRONSAT, [id number], TEN    No results found for: PH, PHARTERIAL, PO2, [id number], SAT, PCO2, HCO3, BASEEXCESS, MARIA LUISA, BEB    @LABRCNTIPR(phv:4,pco2v:4,po2v:4,hco3v:4,eliane:4,o2per:4)@    Echocardiology: No results found for this or any previous visit (from the past 4320 hour(s)).    Chest x-ray: No results found for this or any previous visit from the past 365 days.      Chest CT: No results found for this or any previous visit from the past 365 days.      PFT: Most Recent  [facility] Function Testing    No results found for: 20001  No results found for: 20002  No results found for: 20003  No results found for: 20015  No results found for: 20016  No results found for: 20027  No results found for: 20028  No results found for: 20029  No results found for: 20079  No results found for: 20080  No results found for: 20081  No results found for: 20335  No results found for: 20105  No results found for: 20053  No results found for: 20054  No results found for: 20055      Bennett Ezra Goltz, PA-C, PA-C 9/12/2023               Sincerely,        Bennett Ezra Goltz, PA-C, PA-C

## 2023-09-13 NOTE — NURSING NOTE
"Chief Complaint   Patient presents with    Sleep Problem     Trouble waking up and staying awake       Initial /78   Pulse 86   Ht 1.778 m (5' 10\")   Wt 68.5 kg (151 lb)   SpO2 97%   BMI 21.67 kg/m   Estimated body mass index is 21.67 kg/m  as calculated from the following:    Height as of this encounter: 1.778 m (5' 10\").    Weight as of this encounter: 68.5 kg (151 lb).    Medication Reconciliation: complete  ESS 20  Neck circumference: 36 centimeters.  Ibis Fuller MA       "

## 2023-09-21 ASSESSMENT — HEADACHE IMPACT TEST (HIT 6)
HOW OFTEN HAVE YOU FELT TOO TIRED TO WORK BECAUSE OF YOUR HEADACHES: ALWAYS
WHEN YOU HAVE A HEADACHE HOW OFTEN DO YOU WISH YOU COULD LIE DOWN: ALWAYS
WHEN YOU HAVE HEADACHES HOW OFTEN IS THE PAIN SEVERE: VERY OFTEN
HOW OFTEN HAVE YOU FELT FED UP OR IRRITATED BECAUSE OF YOUR HEADACHES: VERY OFTEN
HOW OFTEN DO HEADACHES LIMIT YOUR DAILY ACTIVITIES: SOMETIMES
HOW OFTEN DID HEADACHS LIMIT CONCENTRATION ON WORK OR DAILY ACTIVITY: SOMETIMES
HIT6 TOTAL SCORE: 68

## 2023-09-26 ENCOUNTER — OFFICE VISIT (OUTPATIENT)
Dept: URGENT CARE | Facility: URGENT CARE | Age: 23
End: 2023-09-26
Payer: COMMERCIAL

## 2023-09-26 VITALS
OXYGEN SATURATION: 98 % | HEART RATE: 94 BPM | DIASTOLIC BLOOD PRESSURE: 77 MMHG | SYSTOLIC BLOOD PRESSURE: 131 MMHG | TEMPERATURE: 99.2 F

## 2023-09-26 DIAGNOSIS — J02.9 SORE THROAT: Primary | ICD-10-CM

## 2023-09-26 LAB
DEPRECATED S PYO AG THROAT QL EIA: NEGATIVE
GROUP A STREP BY PCR: NOT DETECTED

## 2023-09-26 PROCEDURE — 87651 STREP A DNA AMP PROBE: CPT

## 2023-09-26 PROCEDURE — 99213 OFFICE O/P EST LOW 20 MIN: CPT

## 2023-09-26 NOTE — PROGRESS NOTES
Chief Complaint   Patient presents with    Urgent Care     Sore throat, swollen glands x Saturday  -wants strep test        ASSESSMENT/PLAN:  Kallie was seen today for urgent care.    Diagnoses and all orders for this visit:    Sore throat  -     Streptococcus A Rapid Screen w/Reflex to PCR - Clinic Collect  -     Group A Streptococcus PCR Throat Swab    Rapid strep negative.  Likely viral.  Tylenol, ibuprofen, fluids rest    Jessee Freed PA-C      SUBJECTIVE:  Kallie is a 23 year old female who presents to urgent care with 4 days of sore throat, swollen glands, headache and fatigue.  No shortness of breath, chest pain, cough..    ROS: Pertinent ROS neg other than the symptoms noted above in the HPI.     OBJECTIVE:  /77   Pulse 94   Temp 99.2  F (37.3  C) (Tympanic)   SpO2 98%    GENERAL: healthy, alert and no distress  EYES: Eyes grossly normal to inspection, PERRL and conjunctivae and sclerae normal  HENT: ear canals and TM's normal, nose and mouth without ulcers or lesions mild oropharynx erythema  NECK: Bilateral cervical adenopathy    DIAGNOSTICS    No results found for any visits on 09/26/23.     Current Outpatient Medications   Medication    ARIPiprazole (ABILIFY) 5 MG tablet    FLUoxetine (PROZAC) 40 MG capsule    galcanezumab-gnlm (EMGALITY) 120 MG/ML injection    norethindrone (MICRONOR) 0.35 MG tablet    NURTEC 75 MG TBDP    prochlorperazine (COMPAZINE) 10 MG tablet    rizatriptan (MAXALT) 10 MG tablet    valACYclovir (VALTREX) 500 MG tablet     No current facility-administered medications for this visit.      Patient Active Problem List   Diagnosis    Behavior problems    Dyslexia    Helicobacter pylori (H. pylori) infection    Episode of recurrent major depressive disorder (H)    History of multiple concussions    Chronic daily headache    Positive TURNER (antinuclear antibody)      Past Medical History:   Diagnosis Date    Current mild episode of major depressive disorder without prior  episode (H) 2/17/2020    Dyslexia 6/12/2014     Past Surgical History:   Procedure Laterality Date    no surgical history       Family History   Problem Relation Age of Onset    Lupus Mother     Cancer - colorectal Maternal Grandmother     Cancer Maternal Grandmother     Diabetes Maternal Grandmother     Myocardial Infarction Maternal Grandfather     Alzheimer Disease Other         MGGF    Cancer Other         PGGF bladder    Breast Cancer Other         PGGM, great aunts paternal and maternal    Glaucoma No family hx of      Social History     Tobacco Use    Smoking status: Never    Smokeless tobacco: Never   Substance Use Topics    Alcohol use: No              The plan of care was discussed with the patient. They understand and agree with the course of treatment prescribed. A printed summary was given including instructions and medications.  The use of Dragon/[employer] dictation services may have been used to construct the content in this note; any grammatical or spelling errors are non-intentional. Please contact the author of this note directly if you are in need of any clarification.

## 2023-09-28 ENCOUNTER — VIRTUAL VISIT (OUTPATIENT)
Dept: NEUROLOGY | Facility: CLINIC | Age: 23
End: 2023-09-28
Payer: COMMERCIAL

## 2023-09-28 DIAGNOSIS — R51.9 CHRONIC DAILY HEADACHE: ICD-10-CM

## 2023-09-28 DIAGNOSIS — G43.709 CHRONIC MIGRAINE WITHOUT AURA WITHOUT STATUS MIGRAINOSUS, NOT INTRACTABLE: Primary | ICD-10-CM

## 2023-09-28 PROCEDURE — 99214 OFFICE O/P EST MOD 30 MIN: CPT | Mod: 95 | Performed by: PSYCHIATRY & NEUROLOGY

## 2023-09-28 RX ORDER — RIMEGEPANT SULFATE 75 MG/75MG
75 TABLET, ORALLY DISINTEGRATING ORAL DAILY PRN
Qty: 8 TABLET | Refills: 11 | Status: SHIPPED | OUTPATIENT
Start: 2023-09-28 | End: 2024-03-28

## 2023-09-28 RX ORDER — FREMANEZUMAB-VFRM 225 MG/1.5ML
1.5 INJECTION SUBCUTANEOUS
Qty: 1.5 ML | Refills: 11 | Status: SHIPPED | OUTPATIENT
Start: 2023-09-28 | End: 2024-01-05

## 2023-09-28 RX ORDER — PROCHLORPERAZINE MALEATE 10 MG
10 TABLET ORAL EVERY 6 HOURS PRN
Qty: 10 TABLET | Refills: 11 | Status: SHIPPED | OUTPATIENT
Start: 2023-09-28 | End: 2024-03-28

## 2023-09-28 RX ORDER — RIZATRIPTAN BENZOATE 10 MG/1
10 TABLET ORAL
Qty: 9 TABLET | Refills: 11 | Status: SHIPPED | OUTPATIENT
Start: 2023-09-28 | End: 2024-03-28

## 2023-09-28 ASSESSMENT — PAIN SCALES - GENERAL: PAINLEVEL: SEVERE PAIN (6)

## 2023-09-28 NOTE — LETTER
9/28/2023         RE: Kallie Olsen  [address]  [place] [address]        Dear Colleague,    Thank you for referring your patient, Kallie Olsen, to the [facility]. Please see a copy of my visit note below.    Virtual Visit Details    Type of service:  Video Visit     Originating Location (pt. Location): Home    Distant Location (provider location):  On-site  Platform used for Video Visit: [facility]    Headache Neurology Progress Note  September 28, 2023    Subjective:    Kallie Olsen returns for follow up of of chronic migraine and chronic posttraumatic headache.     She injects Emgality every 28 days. No side effects. It lasts a couple weeks before she is due for the next dose.  She has significantly more headaches in the last 2 weeks of each month, as she is coming due.    She has 14/30 headache days per month.    She has not taken rizatriptan and prochlorperazine.     Nurtec is helpful as needed.    Objective:    Vitals: There were no vitals taken for this visit.  General: Cooperative, NAD  Neurologic:  Mental Status: Fully alert, attentive and oriented. Speech clear and fluent.   Cranial Nerves: Facial movements symmetric.   Motor: No abnormal movements.      Assessment/Plan:   Kallie Olsen is a 23 year old female who returns for follow-up of chronic migraine without aura and chronic posttraumatic headache.     We discussed the following symptomatic treatment strategy:  -For acute treatment of headache, I recommend Nurtec 75 mg oral dissolvable tablet daily as needed.  - For acute treatment of headache, I recommend rizatriptan 10 mg taken at the onset of headache, with a repeat dose in 2 hours if needed.  This should not exceed more than 9 days/month to avoid medication overuse.  - For headache related nausea, or as a rescue medicine for migraine, I recommend prochlorperazine 10 mg as needed, not to  exceed more than 9 days/month to avoid medication side effects.     Her headache frequency and severity warrant prevention.  She has trialed several medications in the past, including antidepressants and antiseizure medications, without effect and with significant side effects.  With her history of difficult to control depression, beta-blockers are not recommended.  Similarly, she has difficulty with appetite,  so we will avoid topiramate.  She has previously trialed gabapentin, Zoloft, Lexapro, Wellbutrin, Cymbalta.  Emgality is wearing off 2 weeks before the next dose.  I recommend we try an alternative brand.  -I recommend a trial of Ajovy subcutaneous injection every 30 days.  We will attempt to get preauthorization.  I recommend a 3 to 6-month trial prior to determining effectiveness.     I will plan to see her back in 3 to 6 months to monitor her progress.    Alysa Klein MD  Neurology       Again, thank you for allowing me to participate in the care of your patient.        Sincerely,        Alysa Klein MD

## 2023-09-28 NOTE — PROGRESS NOTES
Virtual Visit Details    Type of service:  Video Visit     Originating Location (pt. Location): Home    Distant Location (provider location):  On-site  Platform used for Video Visit: [facility]    Headache Neurology Progress Note  September 28, 2023    Subjective:    Kallie Olsen returns for follow up of of chronic migraine and chronic posttraumatic headache.     She injects Emgality every 28 days. No side effects. It lasts a couple weeks before she is due for the next dose.  She has significantly more headaches in the last 2 weeks of each month, as she is coming due.    She has 14/30 headache days per month.    She has not taken rizatriptan and prochlorperazine.     Nurtec is helpful as needed.    Objective:    Vitals: There were no vitals taken for this visit.  General: Cooperative, NAD  Neurologic:  Mental Status: Fully alert, attentive and oriented. Speech clear and fluent.   Cranial Nerves: Facial movements symmetric.   Motor: No abnormal movements.      Assessment/Plan:   Kallie Olsen is a 23 year old female who returns for follow-up of chronic migraine without aura and chronic posttraumatic headache.     We discussed the following symptomatic treatment strategy:  -For acute treatment of headache, I recommend Nurtec 75 mg oral dissolvable tablet daily as needed.  - For acute treatment of headache, I recommend rizatriptan 10 mg taken at the onset of headache, with a repeat dose in 2 hours if needed.  This should not exceed more than 9 days/month to avoid medication overuse.  - For headache related nausea, or as a rescue medicine for migraine, I recommend prochlorperazine 10 mg as needed, not to exceed more than 9 days/month to avoid medication side effects.     Her headache frequency and severity warrant prevention.  She has trialed several medications in the past, including antidepressants and antiseizure medications, without effect and with significant side  effects.  With her history of difficult to control depression, beta-blockers are not recommended.  Similarly, she has difficulty with appetite,  so we will avoid topiramate.  She has previously trialed gabapentin, Zoloft, Lexapro, Wellbutrin, Cymbalta.  Emgality is wearing off 2 weeks before the next dose.  I recommend we try an alternative brand.  -I recommend a trial of Ajovy subcutaneous injection every 30 days.  We will attempt to get preauthorization.  I recommend a 3 to 6-month trial prior to determining effectiveness.     I will plan to see her back in 3 to 6 months to monitor her progress.    Alysa Klein MD  Neurology

## 2023-09-28 NOTE — NURSING NOTE
Is the patient currently in the state of [place]? YES    Visit mode:VIDEO    If the visit is dropped, the patient can be reconnected by: VIDEO VISIT: Text to cell phone:   Telephone Information:   Mobile [phone]       Will anyone else be joining the visit? NO  (If patient encounters technical issues they should call [phone]    How would you like to obtain your AVS? MyChart    Are changes needed to the allergy or medication list? Pt stated no med changes    Reason for visit: RECHECK (Follow up Emgality)      Pt has pain in head today pain, pain level 6.       Debbi STEVENSF

## 2023-10-09 ENCOUNTER — TELEPHONE (OUTPATIENT)
Dept: NEUROLOGY | Facility: CLINIC | Age: 23
End: 2023-10-09
Payer: COMMERCIAL

## 2023-10-09 NOTE — TELEPHONE ENCOUNTER
Prior Authorization Retail Medication Request    Medication/Dose: Ajovy 225 mg every 30 days  ICD code (if different than what is on RX):    Previously Tried and Failed:   She has trialed several medications in the past, including antidepressants and antiseizure medications, without effect and with significant side effects.  With her history of difficult to control depression, beta-blockers are not recommended.  Similarly, she has difficulty with appetite,  so we will avoid topiramate.  She has previously trialed gabapentin, Zoloft, Lexapro, Wellbutrin, Cymbalta.      Rationale:  Emgality is wearing off 2 weeks before the next dose.  I recommend we try an alternative brand.     Insurance Name:  [facility]  Insurance ID:  [id number]    Pharmacy Information (if different than what is on RX)  Name:    Phone:

## 2023-10-11 NOTE — TELEPHONE ENCOUNTER
Central Prior Authorization Team   Phone: [phone]    PA Initiation    Medication: Ajovy 225 mg every 30 days  Insurance Company: [facility] - Phone [phone] Fax [phone]  Pharmacy Filling the Rx: [id number] [place] [facility] - [place], [place] - [address]  Filling Pharmacy Phone: [phone]  Filling Pharmacy Fax:    Start Date: 10/11/2023

## 2023-10-13 NOTE — TELEPHONE ENCOUNTER
Prior Authorization Not Needed per Insurance    Medication: Ajovy 225 mg every 30 days-PA NOT NEEDED   Insurance Company: [employer] [place] - Phone [phone] Fax [phone]  Expected CoPay:      Pharm[facility]illing the Rx[id number] - [place], [place] - [address] [place] [address]  Pharmacy Notified:  Yes  Patient Notified:  No     Insurance states that PA is Not Needed and medication is covered. Prior PA Approval on file Until 4/1/2024. Pharmacy stated that they have a paid claim on medication on 10/2/2023 quantity 1.5 for 42 day supply. Insurance covers mediation with a Quantity Limit up to 3 auto injectors (4.5 ml) per 84 days; which patient is able to get medication quantity 1.5 for 28 day supply or 4.5 ml per 84 days as noted below.

## 2023-10-16 ENCOUNTER — TELEPHONE (OUTPATIENT)
Dept: NEUROLOGY | Facility: CLINIC | Age: 23
End: 2023-10-16
Payer: COMMERCIAL

## 2023-10-27 ENCOUNTER — MYC MEDICAL ADVICE (OUTPATIENT)
Dept: NEUROLOGY | Facility: CLINIC | Age: 23
End: 2023-10-27
Payer: COMMERCIAL

## 2023-10-27 ENCOUNTER — MYC REFILL (OUTPATIENT)
Dept: NEUROLOGY | Facility: CLINIC | Age: 23
End: 2023-10-27
Payer: COMMERCIAL

## 2023-10-27 DIAGNOSIS — G43.709 CHRONIC MIGRAINE WITHOUT AURA WITHOUT STATUS MIGRAINOSUS, NOT INTRACTABLE: ICD-10-CM

## 2023-10-27 RX ORDER — RIMEGEPANT SULFATE 75 MG/75MG
75 TABLET, ORALLY DISINTEGRATING ORAL DAILY PRN
Qty: 8 TABLET | Refills: 11 | OUTPATIENT
Start: 2023-10-27

## 2023-10-27 NOTE — TELEPHONE ENCOUNTER
NURTEC 75 MG ODT tablet 8 tablet 11 9/28/2023  Yes   Sig - Route: Place 1 tablet (75 mg) under the tongue daily as needed for migraine - Sublingual   Sent to pharmacy as: Nurtec 75 MG Oral Tablet Disintegrating   Class: E-Prescribe   Order: [id number]   E-Prescribing Status: Receipt confirmed by pharmacy (10/2/2023  7:25 AM CDT)   [facility]

## 2023-12-04 ENCOUNTER — TELEPHONE (OUTPATIENT)
Dept: SLEEP MEDICINE | Facility: CLINIC | Age: 23
End: 2023-12-04
Payer: COMMERCIAL

## 2023-12-04 NOTE — TELEPHONE ENCOUNTER
LVM for pt to call back to schedule, actigraphy p/u, PSG, MSLT and F/U.    SERVANDO Mccurdy, Clinical Specialist - Sangeeta Chapa

## 2023-12-26 ENCOUNTER — VIRTUAL VISIT (OUTPATIENT)
Dept: FAMILY MEDICINE | Facility: CLINIC | Age: 23
End: 2023-12-26
Payer: COMMERCIAL

## 2023-12-26 DIAGNOSIS — G43.709 CHRONIC MIGRAINE WITHOUT AURA WITHOUT STATUS MIGRAINOSUS, NOT INTRACTABLE: ICD-10-CM

## 2023-12-26 DIAGNOSIS — R53.83 FATIGUE, UNSPECIFIED TYPE: Primary | ICD-10-CM

## 2023-12-26 PROCEDURE — 99214 OFFICE O/P EST MOD 30 MIN: CPT | Mod: VID | Performed by: FAMILY MEDICINE

## 2023-12-26 ASSESSMENT — ENCOUNTER SYMPTOMS
HEADACHES: 1
FATIGUE: 1

## 2023-12-26 ASSESSMENT — PATIENT HEALTH QUESTIONNAIRE - PHQ9
SUM OF ALL RESPONSES TO PHQ QUESTIONS 1-9: 12
SUM OF ALL RESPONSES TO PHQ QUESTIONS 1-9: 12
10. IF YOU CHECKED OFF ANY PROBLEMS, HOW DIFFICULT HAVE THESE PROBLEMS MADE IT FOR YOU TO DO YOUR WORK, TAKE CARE OF THINGS AT HOME, OR GET ALONG WITH OTHER PEOPLE: SOMEWHAT DIFFICULT

## 2023-12-26 NOTE — PROGRESS NOTES
"    Instructions Relayed to Patient by Virtual Roomer:     Patient is active on [employer]:   Relayed following to patient: \"It looks like you are active on [employer], are you able to join the visit this way? If not, do you need us to send you a link now or would you like your provider to send a link via text or email when they are ready to initiate the visit?\"    Reminded patient to ensure they were logged on to virtual visit by arrival time listed. Documented in appointment notes if patient had flexibility to initiate visit sooner than arrival time. If pediatric virtual visit, ensured pediatric patient along with parent/guardian will be present for video visit.     Patient offered the website [email or web address] and/or phone number to [employer] Help line: [phone]    Kallie is a 23 year old who is being evaluated via a billable video visit.      How would you like to obtain your AVS? [employer]  If the video visit is dropped, the invitation should be resent by: Text to cell phone: [phone]  Will anyone else be joining your video visit? No      Subjective   Kallie is a 23 year old, presenting for the following health issues:  Headache and Fatigue  -Extreme Fatigue      12/26/2023     9:41 AM   Additional Questions   Roomed by Albert MCMAHON   Accompanied by Self       History of Present Illness       Headaches:   Since the patient's last clinic visit, headaches are: worsened  The patient is getting headaches:  Everyday  She is not able to do normal daily activities when she has a migraine.  The patient is taking the following rescue/relief medications:  No rescue/relief medications   Patient states \"I get no relief\" from the rescue/relief medications.   The patient is taking the following medications to prevent migraines:  Other  In the past 4 weeks, the patient has gone to an Urgent Care or Emergency Room 0 times times due to headaches.    Reason for visit:  Extreme tiredness    She eats 2-3 servings of " fruits and vegetables daily.She consumes 0 sweetened beverage(s) daily.She exercises with enough effort to increase her heart rate 10 to 19 minutes per day.  She exercises with enough effort to increase her heart rate 3 or less days per week.   She is taking medications regularly.       Patient's mother has lupus and wanted her daughter to get screened.      Review of Systems   Constitutional:  Positive for fatigue.   Neurological:  Positive for headaches.      Constitutional, HEENT, cardiovascular, pulmonary, GI, , musculoskeletal, neuro, skin, endocrine and psych systems are negative, except as otherwise noted.      Objective           Vitals:  No vitals were obtained today due to virtual visit.    Physical Exam   GENERAL: Healthy, alert and no distress  EYES: Eyes grossly normal to inspection.  No discharge or erythema, or obvious scleral/conjunctival abnormalities.  RESP: No audible wheeze, cough, or visible cyanosis.  No visible retractions or increased work of breathing.    SKIN: Visible skin clear. No significant rash, abnormal pigmentation or lesions.  NEURO: Cranial nerves grossly intact.  Mentation and speech appropriate for age.  PSYCH: Mentation appears normal, affect normal/bright, judgement and insight intact, normal speech and appearance well-groomed.    A/P:  (R53.83) Fatigue, unspecified type  (primary encounter diagnosis)  Comment:   Plan: CBC with platelets, TSH with free T4 reflex,         Comprehensive metabolic panel (BMP + Alb, Alk         Phos, ALT, AST, Total. Bili, TP), Vitamin D         Deficiency, Anti Nuclear Lisbet IgG by IFA with         Reflex, ESR: Erythrocyte sedimentation rate,         CRP, inflammation        Diffs: anemia, metabolic derangements, thyroid disorder, vitamin d deficiency. Patient's mother wanted lupus screening. TURNER and inflammatory markers added.    (G43.709) Chronic migraine without aura without status migrainosus, not intractable  Comment:   Plan: controlled per  patient with current medications. Patient seeing Neurology.    Barron Dale MD          Video-Visit Details    Type of service:  Video Visit     Originating Location (pt. Location): Home    Distant Location (provider location):  On-site  Platform used for Video Visit: [employer]

## 2023-12-28 ENCOUNTER — LAB (OUTPATIENT)
Dept: LAB | Facility: CLINIC | Age: 23
End: 2023-12-28
Payer: COMMERCIAL

## 2023-12-28 DIAGNOSIS — R53.83 FATIGUE, UNSPECIFIED TYPE: ICD-10-CM

## 2023-12-28 DIAGNOSIS — E55.9 VITAMIN D DEFICIENCY: Primary | ICD-10-CM

## 2023-12-28 LAB
ALBUMIN SERPL BCG-MCNC: 4.7 G/DL (ref 3.5–5.2)
ALP SERPL-CCNC: 81 U/L (ref 40–150)
ALT SERPL W P-5'-P-CCNC: 16 U/L (ref 0–50)
ANION GAP SERPL CALCULATED.3IONS-SCNC: 9 MMOL/L (ref 7–15)
AST SERPL W P-5'-P-CCNC: 21 U/L (ref 0–45)
BILIRUB SERPL-MCNC: 0.5 MG/DL
BUN SERPL-MCNC: 9.4 MG/DL (ref 6–20)
CALCIUM SERPL-MCNC: 9.6 MG/DL (ref 8.6–10)
CHLORIDE SERPL-SCNC: 103 MMOL/L (ref 98–107)
CREAT SERPL-MCNC: 0.8 MG/DL (ref 0.51–0.95)
CRP SERPL-MCNC: <3 MG/L
DEPRECATED HCO3 PLAS-SCNC: 27 MMOL/L (ref 22–29)
EGFRCR SERPLBLD CKD-EPI 2021: >90 ML/MIN/1.73M2
ERYTHROCYTE [DISTWIDTH] IN BLOOD BY AUTOMATED COUNT: 12.2 % (ref 10–15)
ERYTHROCYTE [SEDIMENTATION RATE] IN BLOOD BY WESTERGREN METHOD: 5 MM/HR (ref 0–20)
GLUCOSE SERPL-MCNC: 78 MG/DL (ref 70–99)
HCT VFR BLD AUTO: 45.3 % (ref 35–47)
HGB BLD-MCNC: 14.6 G/DL (ref 11.7–15.7)
MCH RBC QN AUTO: 29.4 PG (ref 26.5–33)
MCHC RBC AUTO-ENTMCNC: 32.2 G/DL (ref 31.5–36.5)
MCV RBC AUTO: 91 FL (ref 78–100)
PLATELET # BLD AUTO: 222 10E3/UL (ref 150–450)
POTASSIUM SERPL-SCNC: 4.1 MMOL/L (ref 3.4–5.3)
PROT SERPL-MCNC: 7.3 G/DL (ref 6.4–8.3)
RBC # BLD AUTO: 4.97 10E6/UL (ref 3.8–5.2)
SODIUM SERPL-SCNC: 139 MMOL/L (ref 135–145)
TSH SERPL DL<=0.005 MIU/L-ACNC: 2.22 UIU/ML (ref 0.3–4.2)
VIT D+METAB SERPL-MCNC: 13 NG/ML (ref 20–50)
WBC # BLD AUTO: 6.8 10E3/UL (ref 4–11)

## 2023-12-28 PROCEDURE — 84443 ASSAY THYROID STIM HORMONE: CPT

## 2023-12-28 PROCEDURE — 82306 VITAMIN D 25 HYDROXY: CPT

## 2023-12-28 PROCEDURE — 86140 C-REACTIVE PROTEIN: CPT

## 2023-12-28 PROCEDURE — 85652 RBC SED RATE AUTOMATED: CPT

## 2023-12-28 PROCEDURE — 86038 ANTINUCLEAR ANTIBODIES: CPT

## 2023-12-28 PROCEDURE — 80053 COMPREHEN METABOLIC PANEL: CPT

## 2023-12-28 PROCEDURE — 36415 COLL VENOUS BLD VENIPUNCTURE: CPT

## 2023-12-28 PROCEDURE — 85027 COMPLETE CBC AUTOMATED: CPT

## 2023-12-29 LAB — ANA SER QL IF: NEGATIVE

## 2024-01-02 RX ORDER — ERGOCALCIFEROL 1.25 MG/1
50000 CAPSULE, LIQUID FILLED ORAL WEEKLY
Qty: 12 CAPSULE | Refills: 4 | Status: SHIPPED | OUTPATIENT
Start: 2024-01-02

## 2024-01-04 ENCOUNTER — MYC MEDICAL ADVICE (OUTPATIENT)
Dept: NEUROLOGY | Facility: CLINIC | Age: 24
End: 2024-01-04
Payer: COMMERCIAL

## 2024-01-04 DIAGNOSIS — G43.709 CHRONIC MIGRAINE WITHOUT AURA WITHOUT STATUS MIGRAINOSUS, NOT INTRACTABLE: ICD-10-CM

## 2024-01-05 RX ORDER — FREMANEZUMAB-VFRM 225 MG/1.5ML
1.5 INJECTION SUBCUTANEOUS
Qty: 4.5 ML | Refills: 2 | Status: SHIPPED | OUTPATIENT
Start: 2024-01-05 | End: 2024-03-19

## 2024-01-05 NOTE — TELEPHONE ENCOUNTER
Sent remaining refills and note to pharmacy that pt is requesting 3 for 84 days.     Edda MARADIAGA RN, BSN  [facility] Neurology

## 2024-02-01 ENCOUNTER — MYC MEDICAL ADVICE (OUTPATIENT)
Dept: NEUROLOGY | Facility: CLINIC | Age: 24
End: 2024-02-01
Payer: COMMERCIAL

## 2024-02-02 NOTE — TELEPHONE ENCOUNTER
Called pharmacy, they indicated that when they run the Rx that it is not approved and exceeds dosing limits.    Called [employer] - they have a PA on file that Ajovy is approved until 4/2024 for 3 injections per 84 days.     Called pharmacy back - still not going through. Pharmacy to talk to [employer] to clarify. Will reach back out to clinic if anything further is needed from us.

## 2024-02-02 NOTE — TELEPHONE ENCOUNTER
Patient unable to fill medication at pharmacy even though PA is not needed per insurance.  See MyChart encounter on 2/1.    Insurance seeing 4.5ML as 4.5 injectors vs the correct Rx of 4.5 ML = 3 injectors. Pharmacy contacted RN that insurance is needing exemption form filled out. Form completed on [email or web address]    Will scan into chart.

## 2024-02-20 ENCOUNTER — PATIENT OUTREACH (OUTPATIENT)
Dept: CARE COORDINATION | Facility: CLINIC | Age: 24
End: 2024-02-20
Payer: COMMERCIAL

## 2024-02-24 DIAGNOSIS — Z30.09 BIRTH CONTROL COUNSELING: ICD-10-CM

## 2024-02-26 RX ORDER — ACETAMINOPHEN AND CODEINE PHOSPHATE 120; 12 MG/5ML; MG/5ML
0.35 SOLUTION ORAL DAILY
Qty: 84 TABLET | Refills: 0 | Status: SHIPPED | OUTPATIENT
Start: 2024-02-26 | End: 2024-03-06

## 2024-02-26 NOTE — TELEPHONE ENCOUNTER
Requested Prescriptions   Pending Prescriptions Disp Refills    norethindrone (MICRONOR) 0.35 MG tablet [Pharmacy Med Name: NORETHINDRONE 0.35 MG TABLET] 84 tablet 3     Sig: TAKE 1 TABLET BY MOUTH EVERY DAY       Contraceptives Protocol Passed - 2/24/2024 12:59 AM        Passed - Patient is not a current smoker if age is 35 or older        Passed - Recent (12 mo) or future (30 days) visit within the authorizing provider's specialty     The patient must have completed an in-person or virtual visit within the past 12 months or has a future visit scheduled within the next 90 days with the authorizing provider s specialty.  Urgent care and e-visits do not quality as an office visit for this protocol.          Passed - Medication is active on med list        Passed - No active pregnancy on record        Passed - No positive pregnancy test in past 12 months           Pt last seen 3/21/2023 for annual    Last prescribed 3/21/2023 for 84 tablets with 3 refills    Pt has appt on 3/8/2024.    Prescription approved per [facility] Refill Protocol.    Brandy Johnson RN on 2/26/2024 at 9:09 AM

## 2024-03-05 ENCOUNTER — PATIENT OUTREACH (OUTPATIENT)
Dept: CARE COORDINATION | Facility: CLINIC | Age: 24
End: 2024-03-05
Payer: COMMERCIAL

## 2024-03-08 ENCOUNTER — OFFICE VISIT (OUTPATIENT)
Dept: OBGYN | Facility: CLINIC | Age: 24
End: 2024-03-08
Payer: COMMERCIAL

## 2024-03-08 VITALS
SYSTOLIC BLOOD PRESSURE: 123 MMHG | BODY MASS INDEX: 23.79 KG/M2 | HEIGHT: 69 IN | WEIGHT: 160.6 LBS | DIASTOLIC BLOOD PRESSURE: 78 MMHG

## 2024-03-08 DIAGNOSIS — F33.2 SEVERE EPISODE OF RECURRENT MAJOR DEPRESSIVE DISORDER, WITHOUT PSYCHOTIC FEATURES (H): ICD-10-CM

## 2024-03-08 DIAGNOSIS — Z01.419 ENCOUNTER FOR GYNECOLOGICAL EXAMINATION WITHOUT ABNORMAL FINDING: Primary | ICD-10-CM

## 2024-03-08 DIAGNOSIS — A60.00 GENITAL HERPES SIMPLEX, UNSPECIFIED SITE: ICD-10-CM

## 2024-03-08 DIAGNOSIS — Z11.3 SCREENING FOR STDS (SEXUALLY TRANSMITTED DISEASES): ICD-10-CM

## 2024-03-08 DIAGNOSIS — Z30.8 ENCOUNTER FOR OTHER CONTRACEPTIVE MANAGEMENT: ICD-10-CM

## 2024-03-08 PROCEDURE — 87591 N.GONORRHOEAE DNA AMP PROB: CPT | Performed by: NURSE PRACTITIONER

## 2024-03-08 PROCEDURE — 99395 PREV VISIT EST AGE 18-39: CPT | Performed by: NURSE PRACTITIONER

## 2024-03-08 PROCEDURE — 87491 CHLMYD TRACH DNA AMP PROBE: CPT | Performed by: NURSE PRACTITIONER

## 2024-03-08 PROCEDURE — G0145 SCR C/V CYTO,THINLAYER,RESCR: HCPCS | Performed by: NURSE PRACTITIONER

## 2024-03-08 RX ORDER — LAMOTRIGINE 25 MG/1
25 TABLET ORAL DAILY
COMMUNITY
Start: 2024-03-06

## 2024-03-08 RX ORDER — VALACYCLOVIR HYDROCHLORIDE 500 MG/1
500 TABLET, FILM COATED ORAL 2 TIMES DAILY
Qty: 6 TABLET | Refills: 4 | Status: SHIPPED | OUTPATIENT
Start: 2024-03-08 | End: 2024-08-12

## 2024-03-08 RX ORDER — ACETAMINOPHEN AND CODEINE PHOSPHATE 120; 12 MG/5ML; MG/5ML
0.35 SOLUTION ORAL DAILY
Qty: 84 TABLET | Refills: 3 | Status: SHIPPED | OUTPATIENT
Start: 2024-03-08

## 2024-03-09 LAB
C TRACH DNA SPEC QL NAA+PROBE: NEGATIVE
N GONORRHOEA DNA SPEC QL NAA+PROBE: NEGATIVE

## 2024-03-12 LAB
BKR LAB AP GYN ADEQUACY: NORMAL
BKR LAB AP GYN INTERPRETATION: NORMAL
BKR LAB AP HPV REFLEX: NO
BKR LAB AP PREVIOUS ABNORMAL: NORMAL
PATH REPORT.COMMENTS IMP SPEC: NORMAL
PATH REPORT.COMMENTS IMP SPEC: NORMAL
PATH REPORT.RELEVANT HX SPEC: NORMAL

## 2024-03-19 DIAGNOSIS — G43.709 CHRONIC MIGRAINE WITHOUT AURA WITHOUT STATUS MIGRAINOSUS, NOT INTRACTABLE: ICD-10-CM

## 2024-03-19 NOTE — TELEPHONE ENCOUNTER
RX Authorization    Medication: Fremanezumab-vfrm (AJOVY) 225 MG/1.5 ML SOAJ    Date last refill ordered: 1/5/2024    Quantity ordered: 4.5 mL    # refills: 2    Date of last clinic visit with ordering provider: 9/28/2023    Date of next clinic visit with ordering provider:    All pertinent protocol data (lab date/result):    Include pertinent information from patients message:

## 2024-03-20 RX ORDER — FREMANEZUMAB-VFRM 225 MG/1.5ML
1.5 INJECTION SUBCUTANEOUS
Qty: 4.5 ML | Refills: 1 | Status: SHIPPED | OUTPATIENT
Start: 2024-03-20

## 2024-03-21 NOTE — TELEPHONE ENCOUNTER
Ortega sent to pt that refills have been sent in.     Let her know to schedule an appointment with Dr. Klein for any further refills.   Edda MARADIAGA RN, BSN  [facility] Neurology

## 2024-03-25 ASSESSMENT — MIGRAINE DISABILITY ASSESSMENT (MIDAS)
HOW OFTEN WERE SOCIAL ACTIVITIES MISSED DUE TO HEADACHES: 5
HOW MANY DAYS DID YOU NOT DO HOUSEWORK BECAUSE OF HEADACHES: 0
HOW MANY DAYS DID YOU MISS WORK OR SCHOOL BECAUSE OF HEADACHES: 8
HOW MANY DAYS WAS HOUSEWORK PRODUCTIVITY CUT IN HALF DUE TO HEADACHES: 0
TOTAL SCORE: 63
HOW MANY DAYS WAS YOUR PRODUCTIVITY CUT IN HALF BECAUSE OF HEADACHES: 50
HOW MANY DAYS IN THE PAST 3 MONTHS HAVE YOU HAD A HEADACHE: 80
ON A SCALE FROM 0-10 ON AVERAGE HOW PAINFUL WERE HEADACHES: 7

## 2024-03-25 ASSESSMENT — HEADACHE IMPACT TEST (HIT 6)
HOW OFTEN DID HEADACHS LIMIT CONCENTRATION ON WORK OR DAILY ACTIVITY: VERY OFTEN
HOW OFTEN HAVE YOU FELT FED UP OR IRRITATED BECAUSE OF YOUR HEADACHES: VERY OFTEN
WHEN YOU HAVE HEADACHES HOW OFTEN IS THE PAIN SEVERE: VERY OFTEN
HOW OFTEN DO HEADACHES LIMIT YOUR DAILY ACTIVITIES: ALWAYS
WHEN YOU HAVE A HEADACHE HOW OFTEN DO YOU WISH YOU COULD LIE DOWN: ALWAYS
HIT6 TOTAL SCORE: 70
HOW OFTEN HAVE YOU FELT TOO TIRED TO WORK BECAUSE OF YOUR HEADACHES: VERY OFTEN

## 2024-03-27 ENCOUNTER — TELEPHONE (OUTPATIENT)
Dept: NEUROSURGERY | Facility: CLINIC | Age: 24
End: 2024-03-27
Payer: COMMERCIAL

## 2024-03-27 NOTE — TELEPHONE ENCOUNTER
Left message for pt w/appt reminder for video visit on 3/28/24 w/Alysa Klein at 2:00pm.        Pt advised to call [phone] with any questions.

## 2024-03-28 ENCOUNTER — VIRTUAL VISIT (OUTPATIENT)
Dept: NEUROLOGY | Facility: CLINIC | Age: 24
End: 2024-03-28
Payer: COMMERCIAL

## 2024-03-28 VITALS — HEIGHT: 70 IN | BODY MASS INDEX: 21.47 KG/M2 | WEIGHT: 150 LBS

## 2024-03-28 DIAGNOSIS — R51.9 CHRONIC DAILY HEADACHE: ICD-10-CM

## 2024-03-28 DIAGNOSIS — G43.709 CHRONIC MIGRAINE WITHOUT AURA WITHOUT STATUS MIGRAINOSUS, NOT INTRACTABLE: Primary | ICD-10-CM

## 2024-03-28 PROCEDURE — G2211 COMPLEX E/M VISIT ADD ON: HCPCS | Mod: 95 | Performed by: PSYCHIATRY & NEUROLOGY

## 2024-03-28 PROCEDURE — 99214 OFFICE O/P EST MOD 30 MIN: CPT | Mod: 95 | Performed by: PSYCHIATRY & NEUROLOGY

## 2024-03-28 RX ORDER — METHYLPREDNISOLONE 4 MG
TABLET, DOSE PACK ORAL
Qty: 21 TABLET | Refills: 0 | Status: SHIPPED | OUTPATIENT
Start: 2024-03-28 | End: 2024-08-12

## 2024-03-28 RX ORDER — PROCHLORPERAZINE MALEATE 10 MG
10 TABLET ORAL EVERY 6 HOURS PRN
Qty: 10 TABLET | Refills: 11 | Status: SHIPPED | OUTPATIENT
Start: 2024-03-28

## 2024-03-28 RX ORDER — RIMEGEPANT SULFATE 75 MG/75MG
75 TABLET, ORALLY DISINTEGRATING ORAL DAILY PRN
Qty: 8 TABLET | Refills: 11 | Status: SHIPPED | OUTPATIENT
Start: 2024-03-28

## 2024-03-28 RX ORDER — RIZATRIPTAN BENZOATE 10 MG/1
10 TABLET ORAL
Qty: 9 TABLET | Refills: 11 | Status: SHIPPED | OUTPATIENT
Start: 2024-03-28

## 2024-03-28 ASSESSMENT — PAIN SCALES - GENERAL: PAINLEVEL: MODERATE PAIN (4)

## 2024-03-28 ASSESSMENT — PATIENT HEALTH QUESTIONNAIRE - PHQ9: SUM OF ALL RESPONSES TO PHQ QUESTIONS 1-9: 16

## 2024-03-28 NOTE — PROGRESS NOTES
"Virtual Visit Details    Type of service:  Video Visit     Originating Location (pt. Location): Home    Distant Location (provider location):  On-site  Platform used for Video Visit: [facility]    Headache Neurology Progress Note  March 28, 2024    Subjective:    Kallie Olsen returns for follow up of chronic migraine without aura and chronic posttraumatic headache.    Today, she reports that she started lamotrigine a few weeks ago. She reports headaches have been sharper and more severe in her occipital and temporal areas.  She attributes this worsening directly to starting lamotrigine.  She is currently taking 50 mg of lamotrigine daily. She is returning to see her prescriber in two weeks.    Prior to starting lamotrigine, headaches were \"pretty controlled\".  She had occasional migraine attacks, but these were milder and easier to treat.    Ajovy was working well. No side effects.    Nurtec and rizatriptan are both helpful.  Currently, she is takes Nurtec and rizatriptan together to treat headache.  This combination is useful, but she does not have enough tablets to continue this.    Objective:    Vitals: Ht 1.778 m (5' 10\")   Wt 68 kg (150 lb)   LMP 03/03/2024   BMI 21.52 kg/m    General: Cooperative, NAD  Neurologic:  Mental Status: Fully alert, attentive and oriented. Speech clear and fluent.   Cranial Nerves: Facial movements symmetric.   Motor: No abnormal movements.      Pertinent Investigations:        11/21/2022    10:43 AM 9/21/2023     9:10 AM 3/25/2024    10:41 AM   HIT-6   When you have headaches, how often is the pain severe 13 11 11   How often do headaches limit your ability to do usual daily activities including household work, work, school, or social activities? 10 10 13   When you have a headache, how often do you wish you could lie down? 13 13 13   In the past 4 weeks, how often have you felt too tired to do work or daily activities because of your " headaches 11 13 11   In the past 4 weeks, how often have you felt fed up or irritated because of your headaches 13 11 11   In the past 4 weeks, how often did headaches limit your ability to concentrate on work or daily activities 11 10 11   HIT-6 Total Score 71 68 70           11/21/2022    10:46 AM 3/25/2024    10:44 AM   MIDAS - in the past three months:   On how many days did you miss work or school because of your headaches? 15 8   How many days was your productivity at work or school reduced by half or more because of your headaches? 30 50   On how many days did you not do household work because of your headaches? 30 0   How many days was your productivity in household work reduced by half or more because of your headaches? 30 0   On how many days did you miss family, social, or leisure activities because of your headaches? 20 5   On how many days did you have a headache? 30 80   On a scale of 0-10, on average how painful were these headaches? 7 7   MIDAS Score 125 (IV - Severe Disability) 63 (IV - Severe Disability)        Assessment/Plan:   Kallie Olsen is a 23 year old woman who returns for follow-up of chronic migraine without aura and chronic posttraumatic headache.  Headaches have worsened after starting lamotrigine.      1 to 5% of people report worsening headache with lamotrigine, so it is possible that this is the etiology.  However, we also discussed that it is possible things will improve as she continues on the medication, and she decided that she will continue it for the next 2 weeks until she can see her prescriber again to discuss whether lamotrigine is worth continuing.  -In the meantime, I offered a Medrol Dosepak, and she is interested in this for treatment of prolonged headache.  We discussed side effects, including upset stomach, difficulty sleeping, increased energy, irritability, and she knows to stop taking it if it starts to affect her mental health.     We discussed the following  symptomatic treatment strategy.  She will continue Nurtec and rizatriptan.  -For acute treatment of headache, I recommend Nurtec 75 mg oral dissolvable tablet daily as needed.  - For acute treatment of headache, I recommend rizatriptan 10 mg taken at the onset of headache, with a repeat dose in 2 hours if needed.  This should not exceed more than 9 days/month to avoid medication overuse.  - For headache related nausea, or as a rescue medicine for migraine, I recommend prochlorperazine 10 mg as needed, not to exceed more than 9 days/month to avoid medication side effects.     Her headache frequency and severity warrant prevention.  She has trialed several medications in the past, including antidepressants and antiseizure medications, without effect and with significant side effects.  With her history of difficult to control depression, beta-blockers are not recommended.  Similarly, she has difficulty with appetite,  so we will avoid topiramate.  She has previously trialed gabapentin, Zoloft, Lexapro, Wellbutrin, Cymbalta.  Emgality is wearing off 2 weeks before the next dose.  I recommend we try an alternative brand.  -Ajovy subcutaneous injection every 30 days had been effective at reducing headache frequency and severity.  We will continue this for now.  If her headaches continue to increase frequency despite continuing lamotrigine for longer, or after stopping lamotrigine potentially, then we will discuss changing Ajovy to an alternative.     I will plan to see her back in 3 to 6 months to monitor her progress.    The longitudinal plan of care for Kallie was addressed during this visit. Due to the added complexity in care, I will continue to support Kallie in the subsequent management of this condition(s) and with the ongoing continuity of care of this condition(s).    Alysa Klein MD  Neurology

## 2024-03-28 NOTE — LETTER
"    3/28/2024         RE: Kallie Olsen  [address]  [place] [address]        Dear Colleague,    Thank you for referring your patient, Kallie Olsen, to the [facility]. Please see a copy of my visit note below.    Virtual Visit Details    Type of service:  Video Visit     Originating Location (pt. Location): Home    Distant Location (provider location):  On-site  Platform used for Video Visit: [facility]    Headache Neurology Progress Note  March 28, 2024    Subjective:    Kallie Olsen returns for follow up of chronic migraine without aura and chronic posttraumatic headache.    Today, she reports that she started lamotrigine a few weeks ago. She reports headaches have been sharper and more severe in her occipital and temporal areas.  She attributes this worsening directly to starting lamotrigine.  She is currently taking 50 mg of lamotrigine daily. She is returning to see her prescriber in two weeks.    Prior to starting lamotrigine, headaches were \"pretty controlled\".  She had occasional migraine attacks, but these were milder and easier to treat.    Ajovy was working well. No side effects.    Nurtec and rizatriptan are both helpful.  Currently, she is takes Nurtec and rizatriptan together to treat headache.  This combination is useful, but she does not have enough tablets to continue this.    Objective:    Vitals: Ht 1.778 m (5' 10\")   Wt 68 kg (150 lb)   LMP 03/03/2024   BMI 21.52 kg/m    General: Cooperative, NAD  Neurologic:  Mental Status: Fully alert, attentive and oriented. Speech clear and fluent.   Cranial Nerves: Facial movements symmetric.   Motor: No abnormal movements.      Pertinent Investigations:        11/21/2022    10:43 AM 9/21/2023     9:10 AM 3/25/2024    10:41 AM   HIT-6   When you have headaches, how often is the pain severe 13 11 11   How often do headaches limit your ability to do usual daily " activities including household work, work, school, or social activities? 10 10 13   When you have a headache, how often do you wish you could lie down? 13 13 13   In the past 4 weeks, how often have you felt too tired to do work or daily activities because of your headaches 11 13 11   In the past 4 weeks, how often have you felt fed up or irritated because of your headaches 13 11 11   In the past 4 weeks, how often did headaches limit your ability to concentrate on work or daily activities 11 10 11   HIT-6 Total Score 71 68 70           11/21/2022    10:46 AM 3/25/2024    10:44 AM   MIDAS - in the past three months:   On how many days did you miss work or school because of your headaches? 15 8   How many days was your productivity at work or school reduced by half or more because of your headaches? 30 50   On how many days did you not do household work because of your headaches? 30 0   How many days was your productivity in household work reduced by half or more because of your headaches? 30 0   On how many days did you miss family, social, or leisure activities because of your headaches? 20 5   On how many days did you have a headache? 30 80   On a scale of 0-10, on average how painful were these headaches? 7 7   MIDAS Score 125 (IV - Severe Disability) 63 (IV - Severe Disability)        Assessment/Plan:   Kallie Olsen is a 23 year old woman who returns for follow-up of chronic migraine without aura and chronic posttraumatic headache.  Headaches have worsened after starting lamotrigine.      1 to 5% of people report worsening headache with lamotrigine, so it is possible that this is the etiology.  However, we also discussed that it is possible things will improve as she continues on the medication, and she decided that she will continue it for the next 2 weeks until she can see her prescriber again to discuss whether lamotrigine is worth continuing.  -In the meantime, I offered a Medrol Dosepak, and she is  interested in this for treatment of prolonged headache.  We discussed side effects, including upset stomach, difficulty sleeping, increased energy, irritability, and she knows to stop taking it if it starts to affect her mental health.     We discussed the following symptomatic treatment strategy.  She will continue Nurtec and rizatriptan.  -For acute treatment of headache, I recommend Nurtec 75 mg oral dissolvable tablet daily as needed.  - For acute treatment of headache, I recommend rizatriptan 10 mg taken at the onset of headache, with a repeat dose in 2 hours if needed.  This should not exceed more than 9 days/month to avoid medication overuse.  - For headache related nausea, or as a rescue medicine for migraine, I recommend prochlorperazine 10 mg as needed, not to exceed more than 9 days/month to avoid medication side effects.     Her headache frequency and severity warrant prevention.  She has trialed several medications in the past, including antidepressants and antiseizure medications, without effect and with significant side effects.  With her history of difficult to control depression, beta-blockers are not recommended.  Similarly, she has difficulty with appetite,  so we will avoid topiramate.  She has previously trialed gabapentin, Zoloft, Lexapro, Wellbutrin, Cymbalta.  Emgality is wearing off 2 weeks before the next dose.  I recommend we try an alternative brand.  -Ajovy subcutaneous injection every 30 days had been effective at reducing headache frequency and severity.  We will continue this for now.  If her headaches continue to increase frequency despite continuing lamotrigine for longer, or after stopping lamotrigine potentially, then we will discuss changing Ajovy to an alternative.     I will plan to see her back in 3 to 6 months to monitor her progress.    The longitudinal plan of care for Kallie was addressed during this visit. Due to the added complexity in care, I will continue to support  Kallie in the subsequent management of this condition(s) and with the ongoing continuity of care of this condition(s).    Alysa Klein MD  Neurology       Again, thank you for allowing me to participate in the care of your patient.        Sincerely,        Alysa Klein MD

## 2024-03-28 NOTE — NURSING NOTE
Is the patient currently in the state of [place]? YES    Visit mode:VIDEO    If the visit is dropped, the patient can be reconnected by: VIDEO VISIT: Text to cell phone:   Telephone Information:   Mobile [phone]       Will anyone else be joining the visit? NO  (If patient encounters technical issues they should call [phone] :[phone])    How would you like to obtain your AVS? MyChart    Are changes needed to the allergy or medication list? Pt stated no changes to allergies and Pt stated no med changes    Reason for visit: [employer] V[employer]    Depression Response    Patient completed the PHQ-9 assessment for depression and scored >9? Yes  Question 9 on the PHQ-9 was positive for suicidality? Yes  Does patient have current mental health provider? Yes    Is this a virtual visit? Yes   Does patient have suicidal ideation (positive question 9)? Yes (adult) - transfer to Red Flag Triage ([phone]) Patient declined transfer.  Notify provider.     I personally notified the following: visit provider

## 2024-04-17 ENCOUNTER — TELEPHONE (OUTPATIENT)
Dept: NEUROLOGY | Facility: CLINIC | Age: 24
End: 2024-04-17

## 2024-04-17 NOTE — TELEPHONE ENCOUNTER
Prior Authorization Specialty Medication Request    Medication/Dose:   Fremanezumab-vfrm (AJOVY) 225 MG/1.5ML SOAJ     Diagnosis and ICD code (if different than what is on RX):    New/renewal/insurance change PA/secondary ins. PA:  Previously Tried and Failed:      Important Lab Values:   Rationale:     Insurance   Primary:     [employer] [place]     Insurance ID:  [id number]     Secondary (if applicable):  Insurance ID:      Pharmacy Information (if different than what is on RX)  Name:    [facility] [id number] [place] [facility] - [place] - [address]     Phone:  [phone]   Fax:[phone]

## 2024-04-30 NOTE — TELEPHONE ENCOUNTER
Retail Pharmacy Prior Authorization Team   Phone: [phone]    PA Initiation    Medication: AJOVY 225 MG/1.5ML SC SOAJ  Insurance Company: [employer] - Phone [phone] Fax [phone]  Pharmacy Filling the Rx: [id number] [place] [facility] [place] - [address]  Filling Pharmacy Phone: [phone]  Filling Pharmacy Fax:    Start Date: 4/30/2024

## 2024-05-02 NOTE — TELEPHONE ENCOUNTER
Prior Authorization Approval    Medication: AJOVY 225 MG/1.5ML SC SOAJ  Authorization Effective Date: 4/1/2024  Authorization Expiration Date: 5/1/2025  Approved Dose/Quantity:   Reference #:     Insurance Company: [employer] - Phone [phone] Fax [phone]  Expected CoPay: $    CoPay Card Available:      Financial Assistance Needed:   Which Pharmacy is filling the prescription: [facility] [id number] [place] [facility] - [place], [place] - [address]  Pharmacy Notified: Yes   Patient Notified: **Instructed pharmacy to notify patient when script is ready to /ship.**

## 2024-08-06 ENCOUNTER — TELEPHONE (OUTPATIENT)
Dept: FAMILY MEDICINE | Facility: CLINIC | Age: 24
End: 2024-08-06
Payer: COMMERCIAL

## 2024-08-06 NOTE — TELEPHONE ENCOUNTER
Order/Referral Request    Who is requesting: patient      Orders being requested: Sleep Study     Reason service is needed/diagnosis: excessive daytime sleepiness     When are orders needed by: at earliest convenience     Has this been discussed with Provider: Yes    Does patient have a preference on a Group/Provider/Facility? [facility]     Does patient have an appointment scheduled?: No    Where to send orders: Place orders within Epic    Could we send this information to you in [facility] or would you prefer to receive a phone call?:   Patient would prefer a phone call   Okay to leave a detailed message?: Yes at Cell number on file:    Telephone Information:   Mobile [phone]

## 2024-08-07 SDOH — HEALTH STABILITY: PHYSICAL HEALTH: ON AVERAGE, HOW MANY DAYS PER WEEK DO YOU ENGAGE IN MODERATE TO STRENUOUS EXERCISE (LIKE A BRISK WALK)?: 2 DAYS

## 2024-08-07 SDOH — HEALTH STABILITY: PHYSICAL HEALTH: ON AVERAGE, HOW MANY MINUTES DO YOU ENGAGE IN EXERCISE AT THIS LEVEL?: 20 MIN

## 2024-08-07 ASSESSMENT — SOCIAL DETERMINANTS OF HEALTH (SDOH): HOW OFTEN DO YOU GET TOGETHER WITH FRIENDS OR RELATIVES?: NEVER

## 2024-08-09 ENCOUNTER — TRANSFERRED RECORDS (OUTPATIENT)
Dept: HEALTH INFORMATION MANAGEMENT | Facility: CLINIC | Age: 24
End: 2024-08-09
Payer: COMMERCIAL

## 2024-08-12 ENCOUNTER — OFFICE VISIT (OUTPATIENT)
Dept: FAMILY MEDICINE | Facility: CLINIC | Age: 24
End: 2024-08-12
Payer: COMMERCIAL

## 2024-08-12 VITALS
RESPIRATION RATE: 16 BRPM | SYSTOLIC BLOOD PRESSURE: 129 MMHG | HEIGHT: 70 IN | DIASTOLIC BLOOD PRESSURE: 75 MMHG | OXYGEN SATURATION: 98 % | BODY MASS INDEX: 23.25 KG/M2 | TEMPERATURE: 97.3 F | WEIGHT: 162.4 LBS | HEART RATE: 85 BPM

## 2024-08-12 DIAGNOSIS — Z71.84 TRAVEL ADVICE ENCOUNTER: Primary | ICD-10-CM

## 2024-08-12 DIAGNOSIS — Z23 NEED FOR VACCINATION: ICD-10-CM

## 2024-08-12 DIAGNOSIS — Z79.2 PROPHYLACTIC ANTIBIOTIC: ICD-10-CM

## 2024-08-12 PROCEDURE — 99213 OFFICE O/P EST LOW 20 MIN: CPT | Performed by: FAMILY MEDICINE

## 2024-08-12 RX ORDER — MEFLOQUINE HYDROCHLORIDE 250 MG/1
250 TABLET ORAL
Qty: 7 TABLET | Refills: 0 | Status: SHIPPED | OUTPATIENT
Start: 2024-09-01 | End: 2024-09-03

## 2024-08-12 RX ORDER — LAMOTRIGINE 100 MG/1
100 TABLET ORAL DAILY
COMMUNITY

## 2024-08-12 ASSESSMENT — PAIN SCALES - GENERAL: PAINLEVEL: NO PAIN (0)

## 2024-08-12 ASSESSMENT — PATIENT HEALTH QUESTIONNAIRE - PHQ9
10. IF YOU CHECKED OFF ANY PROBLEMS, HOW DIFFICULT HAVE THESE PROBLEMS MADE IT FOR YOU TO DO YOUR WORK, TAKE CARE OF THINGS AT HOME, OR GET ALONG WITH OTHER PEOPLE: EXTREMELY DIFFICULT
SUM OF ALL RESPONSES TO PHQ QUESTIONS 1-9: 21
SUM OF ALL RESPONSES TO PHQ QUESTIONS 1-9: 21

## 2024-08-12 NOTE — PATIENT INSTRUCTIONS
At [facility], we strive to deliver an exceptional experience to you, every time we see you. If you receive a survey, please let us know what we are doing well and/or what we could improve upon, as we do value your feedback.  If you have MyChart, you can expect to receive results automatically within 24 hours of their completion.  Your provider will send a note interpreting your results as well.   If you do not have MyChart, you should receive your results in about a week by mail.    Your care team:                            Family Medicine Internal Medicine   MD Sam Almanzar, MD Breanna Roach, MD Zain Holt, MD Poppy Narayanan, PAAmeliaC    Barron Dale, MD Pediatrics   Tracy Brown, MD Fabiana Simon, MD Keri Sheldon, APRN CNP Sheron Poon APRN CNP   MD Roxane Francois, MD Rosa Elena Nava, CNP     Suleiman Hendrix, CNP Same-Day Provider (No follow-up visits)   KENDAL Parikh, DNP Jie Torres, KENDAL Magana, FNP, BC AMOR RaiC     Clinic hours: Monday - Thursday 7 am-6 pm; Fridays 7 am-5 pm.   Urgent care: Monday - Friday 10 am- 8 pm; Saturday and Sunday 9 am-5 pm.    Clinic: [phone]       [facility]: Monday - Thursday 8 am - 7 pm; Friday 8 am - 6 pm  [facility]: [phone]

## 2024-08-12 NOTE — PROGRESS NOTES
"  Assessment & Plan     (Z71.84) Travel advice encounter  (primary encounter diagnosis)  Comment: The patient will be [place] to [place] from 9/8/2024 through 9/15/2024  Plan: Travel Clinic Referral,         mefloquine (LARIAM) 250 MG tablet            (Z79.2) Prophylactic antibiotic  Comment: Malaria prevention  Plan: mefloquine (LARIAM) 250 MG tablet            (Z23) Need for vaccination  Comment: I am referring her to the travel clinic so she can receive CDC-recommended vaccines that are not available in this clinic  Plan: Travel Clinic Referral                    Counseling  Appropriate preventive services were addressed with this patient via screening, questionnaire, or discussion as appropriate for fall prevention, nutrition, physical activity, Tobacco-use cessation, social engagement, weight loss and cognition.  Checklist reviewing preventive services available has been given to the patient.  The patient's PHQ-9 score is consistent with severe depression. She was provided with information regarding depression.             Luis Manuel Arreguin is a 24 year old, presenting for the following health issues:  Travel Clinic      8/12/2024     2:57 PM   Additional Questions   Roomed by Elle   Accompanied by self     HPI               Review of Systems  We discussed the patient's depressive symptoms.  She currently is not suicidal.  She receives several medications from her psychiatrist and receives counseling.      Objective    /75 (BP Location: Left arm, Patient Position: Sitting, Cuff Size: Adult Regular)   Pulse 85   Temp 97.3  F (36.3  C) (Temporal)   Resp 16   Ht 1.77 m (5' 9.69\")   Wt 73.7 kg (162 lb 6.4 oz)   LMP 08/09/2024 (Exact Date)   SpO2 98%   BMI 23.51 kg/m    Body mass index is 23.51 kg/m .  Physical Exam   GENERAL: healthy, alert and no distress  EYES: Eyes grossly normal to inspection, PERRL, EOMI, sclerae white and conjunctivae normal  MS: no " gross musculoskeletal defects noted, no edema  SKIN: no suspicious lesions or rashes to visible skin  NEURO: Normal strength and tone, sensory exam grossly normal, mentation intact, oriented times 3 and cranial nerves 2-12 intact  PSYCH: mentation appears normal, affect normal/bright             Signed Electronically by: Earl Solo MD    Answers submitted by the patient for this visit:  Patient Health Questionnaire (Submitted on 8/12/2024)  If you checked off any problems, how difficult have these problems made it for you to do your work, take care of things at home, or get along with other people?: Extremely difficult  PHQ9 TOTAL SCORE: 21

## 2024-08-19 DIAGNOSIS — Z79.2 PROPHYLACTIC ANTIBIOTIC: ICD-10-CM

## 2024-08-19 DIAGNOSIS — Z71.84 TRAVEL ADVICE ENCOUNTER: ICD-10-CM

## 2024-09-03 RX ORDER — DOXYCYCLINE 100 MG/1
100 TABLET ORAL DAILY
Qty: 40 TABLET | Refills: 0 | Status: SHIPPED | OUTPATIENT
Start: 2024-09-07 | End: 2024-10-17

## 2024-09-03 NOTE — TELEPHONE ENCOUNTER
Called and informed patient replacement script has been sent. Patient states she has already picked up a script for this medication.    Jermaine Jimenez

## 2024-09-07 RX ORDER — LAMOTRIGINE 25 MG/1
25 TABLET ORAL DAILY
Status: CANCELLED | OUTPATIENT
Start: 2024-09-07

## 2024-09-09 NOTE — TELEPHONE ENCOUNTER
Called patient re: refill request below.     Patient states that she has another provider who fills this medication for, Irlanda Newman PA-C, who is her mental health provider. Patient states she'll reach out to their office and/or contact pharmacy to let them know to reach out to that provider for refills. Patient states she does not need refill from our office at this time.      Sana Archer RN, BSN  [facility]

## 2024-11-25 ENCOUNTER — OFFICE VISIT (OUTPATIENT)
Dept: SLEEP MEDICINE | Facility: CLINIC | Age: 24
End: 2024-11-25
Payer: COMMERCIAL

## 2024-11-25 DIAGNOSIS — G47.10 EXCESSIVE SLEEPINESS: Primary | ICD-10-CM

## 2024-12-04 ASSESSMENT — SLEEP AND FATIGUE QUESTIONNAIRES
HOW LIKELY ARE YOU TO NOD OFF OR FALL ASLEEP WHEN YOU ARE A PASSENGER IN A CAR FOR AN HOUR WITHOUT A BREAK: HIGH CHANCE OF DOZING
HOW LIKELY ARE YOU TO NOD OFF OR FALL ASLEEP WHILE WATCHING TV: HIGH CHANCE OF DOZING
HOW LIKELY ARE YOU TO NOD OFF OR FALL ASLEEP WHILE SITTING AND READING: HIGH CHANCE OF DOZING
HOW LIKELY ARE YOU TO NOD OFF OR FALL ASLEEP WHILE SITTING INACTIVE IN A PUBLIC PLACE: HIGH CHANCE OF DOZING
HOW LIKELY ARE YOU TO NOD OFF OR FALL ASLEEP WHILE SITTING QUIETLY AFTER LUNCH WITHOUT ALCOHOL: HIGH CHANCE OF DOZING
HOW LIKELY ARE YOU TO NOD OFF OR FALL ASLEEP IN A CAR, WHILE STOPPED FOR A FEW MINUTES IN TRAFFIC: SLIGHT CHANCE OF DOZING
HOW LIKELY ARE YOU TO NOD OFF OR FALL ASLEEP WHILE SITTING AND TALKING TO SOMEONE: SLIGHT CHANCE OF DOZING
HOW LIKELY ARE YOU TO NOD OFF OR FALL ASLEEP WHILE LYING DOWN TO REST IN THE AFTERNOON WHEN CIRCUMSTANCES PERMIT: HIGH CHANCE OF DOZING

## 2024-12-08 ENCOUNTER — THERAPY VISIT (OUTPATIENT)
Dept: SLEEP MEDICINE | Facility: CLINIC | Age: 24
End: 2024-12-08
Payer: COMMERCIAL

## 2024-12-08 DIAGNOSIS — G47.10 EXCESSIVE SLEEPINESS: ICD-10-CM

## 2024-12-09 ENCOUNTER — THERAPY VISIT (OUTPATIENT)
Dept: SLEEP MEDICINE | Facility: CLINIC | Age: 24
End: 2024-12-09
Payer: COMMERCIAL

## 2024-12-09 ENCOUNTER — DOCUMENTATION ONLY (OUTPATIENT)
Dept: SLEEP MEDICINE | Facility: CLINIC | Age: 24
End: 2024-12-09
Payer: COMMERCIAL

## 2024-12-09 DIAGNOSIS — G47.10 HYPERSOMNIA: Primary | ICD-10-CM

## 2024-12-09 DIAGNOSIS — G47.10 HYPERSOMNIA: ICD-10-CM

## 2024-12-09 LAB
AMPHETAMINES UR QL SCN: NORMAL
BARBITURATES UR QL SCN: NORMAL
BENZODIAZ UR QL SCN: NORMAL
BZE UR QL SCN: NORMAL
CANNABINOIDS UR QL SCN: NORMAL
FENTANYL UR QL: NORMAL
OPIATES UR QL SCN: NORMAL
PCP QUAL URINE (ROCHE): NORMAL

## 2024-12-09 PROCEDURE — 80307 DRUG TEST PRSMV CHEM ANLYZR: CPT

## 2024-12-09 NOTE — PATIENT INSTRUCTIONS
[facility]    1. Your sleep study will be reviewed by a sleep physician.     2. Please follow up in the sleep clinic as scheduled, or, make an appointment with your sleep provider to be seen in 3 months.    3. If you have any questions or problems with your treatment plan, please contact your sleep clinic provider at [phone] to further manage your condition.    4. Please review your attached medication list, and, at your follow-up appointment advise your sleep clinic provider about any changes.    5. Go to [email or web address] for more information about your sleep problems.    Hermes PHILLIP RRT, RPSGT  December 9, 2024

## 2024-12-09 NOTE — PROGRESS NOTES
Nap 1:  Lights Out - Epoch 1               Sleep Onset - Epoch 36               Pt felt a little sleepy.  She did  not feel that she slept.    Nap 2:  Lights Out - Epoch 228               Sleep Onset - Epoch 246               Pt felt a little sleepy. She did not feel that she slept.    Nap 3:  Lights Out - Epoch 473               Sleep Onset - Epoch 499               Pt felt a little sleepy. She did not feel that she slept.    Nap 4:  Lights Out - Epoch 718    Sleep Onset: 744               Pt felt sleepy.  Pt felt that she slept but did not dream    Nap 5: Lights Out - [id number]              Sleep Onset - Epoch 979              Pt felt a little sleepy. She felt that she slept a little but did not dream.

## 2024-12-09 NOTE — PROGRESS NOTES
Pt returned actigraphy watch.  Device was downloaded and report and sleep logs were scanned in to Epic.    SERVANDO Hughes, Clinical Specialist - [place]

## 2024-12-10 NOTE — PROCEDURES
" SLEEP STUDY INTERPRETATION  DIAGNOSTIC POLYSOMNOGRAPHY REPORT      Patient: MORRIS DAVIDSON  YOB: 2000  Study Date: 12/8/2024  MRN: [id number]  Referring Provider: Flor Birmingham MD  Ordering Provider: Goltz, Bennett, PA-C    Indications for Polysomnography: The patient is a 24 year old Female who is 5' 10\" and weighs 162.0 lbs. Her BMI is 23.5, [place] sleepiness scale 20 and neck circumference is 36 cm. Relevant medical history includes excessive daytime sleepiness. A diagnostic polysomnogram was performed to evaluate for conditions that can lead to hypersomnia.    Polysomnogram Data: A full night polysomnogram recorded the standard physiologic parameters including EEG, EOG, EMG, ECG, nasal and oral airflow. Respiratory parameters of chest and abdominal movements were recorded with respiratory inductance plethysmography. Oxygen saturation was recorded by pulse oximetry. Hypopnea scoring rule used: 1B 4%.    Sleep Architecture: Sleep fragmentation, prolonged REM latency  The total recording time of the polysomnogram was 565.0 minutes. The total sleep time was 461.0 minutes. Sleep latency was 22.0 minutes. REM latency was 294.5 minutes. Arousal index was 31.1 arousals per hour. Sleep efficiency was 81.6%. Wake after sleep onset was 82.0 minutes. The patient spent 3.9% of total sleep time in Stage N1, 70.5% in Stage N2, 11.5% in Stage N3, and 14.1% in REM. Time in REM supine was 47.0 minutes.    Respiration: The patient did not demonstrate clinically significant sleep disordered breathing.  Of note, this was a good study that included REM supine.    Events ? The polysomnogram revealed a presence of - obstructive, 2 central, and - mixed apneas resulting in an apnea index of 0.3 events per hour. There were - obstructive hypopneas and - central hypopneas resulting in an obstructive hypopnea index of - and central hypopnea index of - events per hour. The combined apnea/hypopnea index was 0.3 events " per hour (central apnea/hypopnea index was 0.3 events per hour). The REM AHI was - events per hour. The supine AHI was 0.4 events per hour. The RERA index was 15.7 events per hour.  The RDI was 16.0 events per hour.  Snoring - was reported as absent.  Respiratory rate and pattern - was notable for normal respiratory rate and pattern.  Sustained Sleep Associated Hypoventilation - Transcutaneous carbon dioxide monitoring was not used.  Sleep Associated Hypoxemia - (Greater than 5 minutes O2 sat at or below 88%) was not present. Baseline oxygen saturation was 95.8%. Lowest oxygen saturation was 91.0%. Time spent less than or equal to 88% was 0 minutes. Time spent less than or equal to 89% was 0 minutes.    Movement Activity: Mild elevation on transient REM motor activity.  Periodic Limb Activity - There were 4 PLMs during the entire study. The PLM index was 0.5 movements per hour. The PLM Arousal Index was - per hour.  REM EMG Activity - Excessive transient muscle activity was present.  Nocturnal Behavior - Abnormal sleep related behaviors were not noted.  Bruxism - None apparent.    Cardiac Summary: Sinus  The average pulse rate was 58.8 bpm. The minimum pulse rate was 48.0 bpm while the maximum pulse rate was 95.0 bpm.      Pre PSG Evaluation: Inconsistent sleep wake pattern on actigraphy. Sleep onset varied from 11pm-4am. Sleep offset time varied from 5am-1pm.    Assessment:   Inconsistent sleep wake pattern on actigraphy. Sleep onset varied from 11pm-4am. Sleep offset time varied from 5am-1pm.   The patient did not demonstrate clinically significant sleep disordered breathing.  Of note, this was a good study that included REM supine.    Mild elevation on transient REM motor activity.    Recommendations:  Patient to stay for MSLT to quantify daytime sleepiness.   Recommend optimizing the duration and circadian timing of sleep.  Advice regarding the risks of drowsy driving.  Suggest optimizing sleep schedule and  avoiding sleep deprivation.   Recommend screening for dream enactment and if present consider a diagnosis of RBD (elevated REM motor tone is not unusual in the setting of SSRI treatment).    Diagnostic Codes: F51.11, G47.9          Severiano Toscano MD 12-  Diplomate, Sleep Medicine  American Board of Psychiatry and Neurology

## 2024-12-10 NOTE — PROCEDURES
MSLT REPORT          Patient Name: MORRIS DAVIDSON Study Date: 12/9/2024   YOB: 2000 Study Type: MSLT   Age:  24 year MRN: [id number]   Sex: Female Interp Physician: nicole   BMI:  23.5 Ordering Physician: Goltz, Pa-C, Bennett   Height: 5' 10  Referring Physician: -   Weight: 162.0 lbs Recording Tech: SERVANDO Hughes   [place]: 20 Neck Size (cm): 36 Scoring Tech: SERVANDO Hughes   Nap Summary       Nap 1 Nap 2 Nap 3 Nap 4 Nap 5 Average   Lights Off 08:38:35 AM 10:32:05 AM 12:34:35 PM 02:37:04 PM 04:33:05 PM -   Lights On 09:11:04 AM 10:56:04 AM 01:02:34 PM 03:05:04 PM 05:02:34 PM -   Time In Bed 32.5 24.0 28.0 28.0 29.5 28.4   Sleep Time 14.0 14.5 12.0 15.0 15.0 14.1   Sleep Efficiency 43.1% 60.5% 42.9% 53.6% 50.9% 49.7%   Sleep Onset 08:56:04 AM 10:40:34 AM 12:47:34 PM 02:50:04 PM 04:47:34 PM -   Sleep Latency 17.5 8.5 13.0 13.0 14.5 13.3   REM Onset - - - - - -   REM Sleep Onset Latency - - - - - -         Recording / Sleep Tech Comments    This MSLT was recorded after overnight polysomnography -- Lights On at 07:05 (TRT =565 min, TST = 461 min).  Patient was monitored all day and demonstrated compliance with all technologist's instructions.  Sleep onset detected in all study periods. SOREMPs were not observed.  Actiwatch was collected and downloaded.  Random UA was collected.    Physician Interpretation  This study demonstrated a mean sleep latency of 13.3 minutes over 5 naps without Sleep Onset REM periods.  This study is not suggestive of a CNS hypersomnia. Clinical correlation required.  Advise optimizing the duration and circadian timing of sleep.  Caution regarding driving safety regarding fatigue.     Diagnostic Code: G47.9          Severiano Toscano MD 12-  Diplomate, Sleep Medicine  American Board of Psychiatry and Neurology

## 2024-12-11 LAB — SLPCOMP: NORMAL

## 2025-01-24 ENCOUNTER — TELEPHONE (OUTPATIENT)
Dept: NEUROLOGY | Facility: CLINIC | Age: 25
End: 2025-01-24
Payer: COMMERCIAL

## 2025-01-24 NOTE — TELEPHONE ENCOUNTER
Letter received from [facility] Emerson Helm DDS.    Patient is being referred to [facility] for removal of four wisdom teeth. She has a history of concussion syndrome 3+ years ago and continues to have occipital neuralgia=type symptoms. Question for Dr. Klein if it is safe to proceed with Versed/Fentanyl sedation without exacerbation of any concussion syndrome issues. Needing note to send that clinic if appropriate/not appropriate.

## 2025-02-06 ENCOUNTER — PATIENT OUTREACH (OUTPATIENT)
Dept: CARE COORDINATION | Facility: CLINIC | Age: 25
End: 2025-02-06
Payer: COMMERCIAL

## 2025-02-20 ENCOUNTER — PATIENT OUTREACH (OUTPATIENT)
Dept: CARE COORDINATION | Facility: CLINIC | Age: 25
End: 2025-02-20
Payer: COMMERCIAL

## 2025-02-24 ENCOUNTER — OFFICE VISIT (OUTPATIENT)
Dept: SLEEP MEDICINE | Facility: CLINIC | Age: 25
End: 2025-02-24
Payer: COMMERCIAL

## 2025-02-24 VITALS
WEIGHT: 163.9 LBS | HEART RATE: 80 BPM | OXYGEN SATURATION: 97 % | BODY MASS INDEX: 23.73 KG/M2 | DIASTOLIC BLOOD PRESSURE: 80 MMHG | SYSTOLIC BLOOD PRESSURE: 119 MMHG

## 2025-02-24 DIAGNOSIS — G47.26 SHIFT WORK SLEEP DISORDER: ICD-10-CM

## 2025-02-24 DIAGNOSIS — F45.8 BRUXISM: ICD-10-CM

## 2025-02-24 DIAGNOSIS — G47.8 UPPER AIRWAY RESISTANCE SYNDROME: Primary | ICD-10-CM

## 2025-02-24 PROCEDURE — 99214 OFFICE O/P EST MOD 30 MIN: CPT | Performed by: INTERNAL MEDICINE

## 2025-02-24 ASSESSMENT — SLEEP AND FATIGUE QUESTIONNAIRES
HOW LIKELY ARE YOU TO NOD OFF OR FALL ASLEEP WHILE SITTING AND READING: HIGH CHANCE OF DOZING
HOW LIKELY ARE YOU TO NOD OFF OR FALL ASLEEP IN A CAR, WHILE STOPPED FOR A FEW MINUTES IN TRAFFIC: HIGH CHANCE OF DOZING
HOW LIKELY ARE YOU TO NOD OFF OR FALL ASLEEP WHEN YOU ARE A PASSENGER IN A CAR FOR AN HOUR WITHOUT A BREAK: HIGH CHANCE OF DOZING
HOW LIKELY ARE YOU TO NOD OFF OR FALL ASLEEP WHILE SITTING AND TALKING TO SOMEONE: SLIGHT CHANCE OF DOZING
HOW LIKELY ARE YOU TO NOD OFF OR FALL ASLEEP WHILE SITTING INACTIVE IN A PUBLIC PLACE: HIGH CHANCE OF DOZING
HOW LIKELY ARE YOU TO NOD OFF OR FALL ASLEEP WHILE SITTING QUIETLY AFTER LUNCH WITHOUT ALCOHOL: HIGH CHANCE OF DOZING
HOW LIKELY ARE YOU TO NOD OFF OR FALL ASLEEP WHILE LYING DOWN TO REST IN THE AFTERNOON WHEN CIRCUMSTANCES PERMIT: HIGH CHANCE OF DOZING
HOW LIKELY ARE YOU TO NOD OFF OR FALL ASLEEP WHILE WATCHING TV: HIGH CHANCE OF DOZING

## 2025-02-24 NOTE — NURSING NOTE
"Chief Complaint   Patient presents with    Sleep Problem     PSG/MSLT Follow up       Initial /80   Pulse 80   Wt 74.3 kg (163 lb 14.4 oz)   SpO2 97%   BMI 23.73 kg/m   Estimated body mass index is 23.73 kg/m  as calculated from the following:    Height as of 8/12/24: 1.77 m (5' 9.69\").    Weight as of this encounter: 74.3 kg (163 lb 14.4 oz).    Medication Reconciliation: complete    ESS 22    PRISCILLA 15     Abner Gale MA   "

## 2025-02-24 NOTE — PROGRESS NOTES
[facility]  Sleep clinic follow-up visit note   Date: February 24, 2025     Chief complaint: Review results of the sleep studies     Kallie Olsen is a 24 year old female who presents to the sleep clinic accompanied by her mother to review the results of the recently obtained sleep studies which include actigraphy, polysomnography and MSLT, which were recommended to evaluate for pathological hypersomnolence. Patient was previously seen by my colleague Bennett Goltz, PA-C    Polysomnogram report:  Study date: 12/8/24  Sleep Architecture: Sleep fragmentation, prolonged REM latency The total recording time of the polysomnogram was 565.0 minutes. The total sleep time was 461.0 minutes. Sleep latency was 22.0 minutes. REM latency was 294.5 minutes. Arousal index was 31.1 arousals per hour. Sleep efficiency was 81.6%. Wake after sleep onset was 82.0 minutes. The patient spent 3.9% of total sleep time in Stage N1, 70.5% in Stage N2, 11.5% in Stage N3, and 14.1% in REM. Time in REM supine was 47.0 minutes.     Respiration: The patient did not demonstrate clinically significant sleep disordered breathing. Of note, this was a good study that included REM supine.   ? Events ? The polysomnogram revealed a presence of - obstructive, 2 central, and - mixed apneas resulting in an apnea index of 0.3 events per hour. There were - obstructive hypopneas and - central hypopneas resulting in an obstructive hypopnea index of - and central hypopnea index of - events per hour. The combined apnea/hypopnea index was 0.3 events per hour (central apnea/hypopnea index was 0.3 events per hour). The REM AHI was - events per hour. The supine AHI was 0.4 events per hour. The RERA index was 15.7 events per hour. The RDI was 16.0 events per hour. ? Snoring - was reported as absent.   ? Respiratory rate and pattern - was notable for normal respiratory rate and pattern.   ? Sustained Sleep Associated Hypoventilation - Transcutaneous  carbon dioxide monitoring was not used.   ? Sleep Associated Hypoxemia - (Greater than 5 minutes O2 sat at or below 88%) was not present. Baseline oxygen saturation was 95.8%. Lowest oxygen saturation was 91.0%. Time spent less than or equal to 88% was 0 minutes. Time spent less than or equal to 89% was 0 minutes.     Movement Activity: Mild elevation on transient REM motor activity.   ? Periodic Limb Activity - There were 4 PLMs during the entire study. The PLM index was 0.5 movements per hour. The PLM Arousal Index was - per hour.   ? REM EMG Activity - Excessive transient muscle activity was present.   ? Nocturnal Behavior - Abnormal sleep related behaviors were not noted.   ? Bruxism - None apparent.     Cardiac Summary: Sinus The average pulse rate was 58.8 bpm.   The minimum pulse rate was 48.0 bpm while the maximum pulse rate was 95.0 bpm.     Pre PSG Evaluation(Actigraphy): Inconsistent sleep wake pattern on actigraphy. Sleep onset varied from 11pm-4am. Sleep offset time varied from 5am-1pm.     Assessment:    ? Inconsistent sleep wake pattern on actigraphy. Sleep onset varied from 11pm-4am. Sleep offset time varied from 5am1pm.   ? The patient did not demonstrate clinically significant sleep disordered breathing. Of note, this was a good study that included REM supine.   ? Mild elevation on transient REM motor activity    Multiple sleep latency test  Study date: December 9, 2024  MSLT consisted of a total of 5 naps sessions. The study demonstrated a mean sleep latency of 13.3 minutes over 5 naps without Sleep Onset REM periods. This study is not suggestive of a CNS hypersomnia. Clinical correlation required    Test results were discussed with the patient in detail. Patient does shift work with very erratic hours,varying schedule daily, some days works 2 hours and some days works 22 hours. Work could start as early as 3 AM, sometimes later or could be night shift. She denies difficulty falling or staying  asleep. Snoring was not reported during PSG but mom reports occasional snoring. Patient reports teeth grinding.     Past medical/surgical history, family history, social history, medications and allergies were reviewed.      Problem list:  Patient Active Problem List   Diagnosis    Behavior problems    Dyslexia    Helicobacter pylori (H. pylori) infection    Episode of recurrent major depressive disorder    History of multiple concussions    Chronic daily headache    Positive TURNER (antinuclear antibody)    Severe episode of recurrent major depressive disorder, without psychotic features (H)               Physical Examination:   General: Pleasant. Cooperative. In no apparent distress.  Pulmonary: Able to speak in full sentences easily. No cough or wheeze.   Neurologic: Alert, oriented x3.  Psychiatric: Mood euthymic. Affect congruent with full range and intensity.     ASSESSMENT/PLAN:  Summary diagnoses:  Upper airway resistance syndrome: We discussed the option of mandibular advancement device through referral to sleep dentistry to control snoring, UARS and bruxism.  Patient was interested and the referral was provided.     Shift work: We discussed the importance of prioritizing sleep, following a regular sleep schedule and avoiding sleep deprivation. She was instructed to talk to her employer about making some accommodations to her work schedule, keeping it somewhat consistent, so it does not jeopardize her sleep. She was provided with some tips to promote sleep with shift work as summary.    Depression:Recommend to continue follow-up regularly with psychiatry for optimizing the management of depression    Recommend to follow-up with her primary care provider to obtain further evaluation to check for other possible causes of fatigue which include serum iron, serum ferritin, percent iron saturation, total iron binding capacity, TSH, and to recheck the vitamin D levels.    She will follow up with sleep clinic in 6  "months and will bring sleep logs to review at the time of her follow up.    Patient was strongly advised to avoid driving, operating any heavy machinery or other hazardous situations while drowsy or sleepy.  Patient was counseled on the importance of driving while alert, to pull over if drowsy, or nap before getting into the vehicle if sleepy.      Comorbid Diagnoses:  Depression, multiple concussions, migraines, borderline positive TURNER     The above note was dictated using voice recognition software. Although reviewed after completion, some word and grammatical error may remain . Please contact the author for any clarifications.      \" Total time spent was 35 minutes for this appointment on this date of service which include time spent before, during and after the visit for chart review, patient care, counseling and coordination of care. Including documentation\"       Margie Barr MD  [facility]  [address] , [place] [address]    "

## 2025-02-24 NOTE — PATIENT INSTRUCTIONS
"Summary recommendations:   Please follow a regular sleep schedule aiming at obtaining 8-9 hours of sleep, and avoid sleep deprivation.  Please talk to your employer to see if there is a possibility of making your work schedule consistent than the current schedule.  Please follow some of the tips listed below to improve sleep while doing shift work.  The sleep study which you completed recently showed evidence of upper airway resistance syndrome which is a subtype of obstructive sleep apnea that is commonly seen in women. For that I have recommended referral to sleep dentistry in order to obtain a mandibular advancement device so that it can help control snoring if it is concerning, the upper airway resistance syndrome, also affords some protection against teeth grinding as well.  Continue follow-up regularly with psychiatry for optimizing the management of anxiety and depression  Please follow-up with your primary care provider in order to obtain further evaluation to check for other possible causes of fatigue which include serum iron, ferritin, percent iron saturation, total iron binding capacity, TSH, recheck the vitamin D levels.    Please avoid driving, operating any heavy machinery or other hazardous situations while drowsy or sleepy.      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 Also, the more sleepy/fatigued you are, the more likely you are to experience a \"microsleep,\" an involuntary bout of sleep brought on by sleep deprivation that lasts for a few seconds.  Here are some tips for staying alert on the job:  Avoid long commutes and extended hours.   Take short nap breaks throughout the shift.   Work with others to help keep you alert.   Try to be active during breaks (e.g., take a walk, shoot hoops in the parking lot, or even exercise).   Drink a caffeinated beverage (coffee, tea, candi) to help maintain alertness " during the shift.   Don't leave the most tedious or boring tasks to the end of your shift when you are apt to feel the drowsiest. Night shift workers are most sleepy around 4-5 a.m.   Exchange ideas with your colleagues on ways to cope with the problems of shift work. Set up a support group at work so that you can discuss these issues and learn from each other.   For some shift workers, napping is essential. It can be extremely effective at eliminating fatigue-related accidents and injuries and reducing workers compensation costs. Although most employers do not allow napping in the workplace, a ban on napping may soon prove to be a legal liability. Thus, efforts to make workplace policies nap-friendly may soon gain popularity as the issue increases in global significance.  Here are some tips for sleeping during the day:  Wear dark glasses to block out the sunlight on your way home.   Keep to the same bedtime and wake time schedule, even on weekends.   Eliminate noise and light from your sleep environment (use eye masks and ear plugs).   Avoid caffeinated beverages and foods close to bedtime.   Avoid alcohol; although it may seem to improve sleep initially, tolerance develops quickly and it will soon disturb sleep.          [facility]  Search engine: https://mms.aadsm.org/members/directory/search_bootstrap.php?org_id=ADSM&   Certified in Dental Sleep Medicine     Toni Coffey              [facility] [employer]              Degree: MD KURT              [address]  [address]  [place] [address]  Appointments: [phone]        Miguel Ruiz  Degree: KURT  [address]  [address]  [place] [address]  Professional Phone: [phone]  Website: [email or web address]     Deep Roper  Degree: KURT  [facility]  [address]  [address]  [place] [address]  Professional Phone: [phone]: [phone]     [facility]  [address] [place] [address]  [address]  [place] [address]  Appointments: [phone]  Fax: [phone]     Helen Eli  [facility]  [address]  [place] [address]  Professional Phone: [phone]  Website: [email or web address]         Earl Ziegler  Degree: DDS  [address]  [address]  [place] [address]  Professional Phone: [phone]  Fax: [phone]     Jordan Cabrera  Degree: DDS  [facility]  [address]  [address]  [place] [address]  Professional Phone: [phone]  Website: [email or web address]      [facility]  [facility] [facility]-you should verify insurance coverage  [facility] [facility] [address], [place] [address]  Professional Phone: [phone]  Website: [email or web address]      Keri Bain--DOES NOT ACCEPT INSURANCE  Degree: KURT--you should verify insurance coverage  [facility]  [address] [place] [address]  Professional Phone: [phone]     Jennifer Zepeda  Degree: KURT, PhD  [facility]  [address] [place] [address]   Appointments: [phone]   Fax: [phone]      Seth De León- [facility]  Degree: KURT  [address] [place] [address]  Professional Phone: [phone]  Fax: [phone]  Website: [email or web address]      Nathan Vaughn  Degree: KURT  [facility]  [address] [place] [address]     Mariona Mulet Pradera  Degree: KURT, MS  [facility] T[facility], Oral Medicine[facility]  [address] [place] [address]  Professional Phone: [phone]       Shasta Nagel  Degree: KURT, MS  [facility]  [address]  [place] [address]  Professional Phone: [phone]     Annmarie Watson  Degree: KURT  [place] [address]  [place] [address]  Appointments: [phone]     Lylejune Cash  Degree: DDS  [facility]  [address] [place] [address]  Professional Phone: [phone]  Website: [email or web address]      Harlan Leojeri  Degree: DDS  [facility]  [address] [place] [address]  Professional Phone: [phone]  Fax: [phone]      Gucci Shell  Degree: DDS  [facility]  [address] [facility] [address] [facility] [place] [facility]  [address]  [place] [address]     David Barbour              Degree: DDS              [facility]              [address]  [place] [address]  Phone [phone]  Website: [email or web address]     Stephanie Gonzalez              Degree: DDS              [address]  [address]  [place] [address]  Appointments: [phone]     Heidi Newell              Degree: DDS              [address] [place] [address]              Appointments: [phone]     Juan Barry              Degree: DDS              [address] [address]              Appointments: [phone]     Jurgen Munson              Degree: DDS              [address] [place] [address]              Appointments: [phone]     Kenneth Clancy              Degree: DDS              [facility]              [place] [address]              Appointments: [phone]     Darling Gonzalez              Degree: DDS  [facility] [place] [address]  Appointments: [phone]     Andrew Carrell              [address] [place] [address]  Appointments: [phone]     Malissa Esquivel              Degree: DDS              [facility] [address] [address]              Appointments: [phone]     Renan Cavazos              Degree:  KURT              [address] [place] [address]              Appointments: [phone]     [facility]               Degree: KURT  [address]  [address]  [place] [address]  Appointments: [phone]     Musa Reaves              Degree: KURT [facility]              [address]  [address]  [place] [address]  Appointment: [phone]     Mary Kay Girl              Degree: GUIDO, MS, FAAOP              [facility]  [address]   [address]  [place] [address]  Appointments: [phone]     Toni Virgen              Degree: KURT              [address]              [address] [place] [address]              Appointments: [phone]              Fax: [phone]                         ACCEPT MEDICARE  Pnafilo Marquez DDS  [address], [place] [address]  [phone]; [phone] (fax)  [employer]     Arnulfo Weaver DDS, MS   [facility]   [address] [place] [address].   [address]   [place] [address]   Appointments: [phone]   Fax: [phone]      Nicola Smart              Degree: DMD, MSD              [facility]              [address]  [address]  [place] [address]  Appointments: [phone]  Fax: [phone]        ADDITIONAL PROVIDERS     Chidi West DDS               [facility]              Dental [facility]  [address] [place] [address]  Appointments: [phone]      [facility]  [address] [facility] [address] [facility] [address] [facility]  [address]  [place] [address]  Appointments: [phone]  Fax- [phone]     Radha Nicole              Degree: KURT              [facility]               [address]  [address]  [place] [address]  Appointments: [phone]

## 2025-03-04 ENCOUNTER — MYC REFILL (OUTPATIENT)
Dept: NEUROLOGY | Facility: CLINIC | Age: 25
End: 2025-03-04
Payer: COMMERCIAL

## 2025-03-04 DIAGNOSIS — G43.709 CHRONIC MIGRAINE WITHOUT AURA WITHOUT STATUS MIGRAINOSUS, NOT INTRACTABLE: ICD-10-CM

## 2025-03-05 NOTE — TELEPHONE ENCOUNTER
Refill Request    Pending Prescriptions:                       Disp   Refills    NURTEC 75 MG ODT tablet                   8 tabl*11           Sig: Place 1 tablet (75 mg) under the tongue daily as           needed for migraine.      Last Office Visit: 03/28/24    Next Office Visit: none    Prescribing Provider: Ernie    Last Medication Refill Date: 02/10/25 (refill request sent in)    Prior Auth Date (if applicable): 02/10/25 appeal 03/04/25    Chart Notes:  Nurtec and rizatriptan are both helpful.  Currently, she is takes Nurtec and rizatriptan together to treat headache.  This combination is useful, but she does not have enough tablets to continue this.     Action(s):  Routing to RN team. At this time appeal is still in progress as of 03/04/25

## 2025-03-06 RX ORDER — RIMEGEPANT SULFATE 75 MG/75MG
75 TABLET, ORALLY DISINTEGRATING ORAL DAILY PRN
Qty: 8 TABLET | Refills: 11 | Status: SHIPPED | OUTPATIENT
Start: 2025-03-06

## 2025-03-06 NOTE — TELEPHONE ENCOUNTER
Last Written Prescription Date:  03/28/2024  Last Fill Quantity: 8,  # refills: 11    Patient is due for a refill. Please see 02/10/2025 TE, prior authorization appeal is still in process for the Nurtec. Will update pt via [employer] and send encounter to Dr. Klein to sign refill request.     Routing refill request to provider for review/approval because:  Drug not on the G refill protocol     Shima MCMAHON RN, BSN  [facility]